# Patient Record
Sex: FEMALE | Employment: FULL TIME | URBAN - METROPOLITAN AREA
[De-identification: names, ages, dates, MRNs, and addresses within clinical notes are randomized per-mention and may not be internally consistent; named-entity substitution may affect disease eponyms.]

---

## 2024-01-10 ENCOUNTER — APPOINTMENT (OUTPATIENT)
Dept: OBSTETRICS AND GYNECOLOGY | Facility: HOSPITAL | Age: 29
End: 2024-01-10
Payer: MEDICAID

## 2024-02-01 ENCOUNTER — APPOINTMENT (OUTPATIENT)
Dept: OBSTETRICS AND GYNECOLOGY | Facility: CLINIC | Age: 29
End: 2024-02-01
Payer: MEDICAID

## 2024-02-05 ENCOUNTER — OFFICE VISIT (OUTPATIENT)
Dept: OBSTETRICS AND GYNECOLOGY | Facility: CLINIC | Age: 29
End: 2024-02-05
Payer: MEDICAID

## 2024-02-05 VITALS — DIASTOLIC BLOOD PRESSURE: 83 MMHG | SYSTOLIC BLOOD PRESSURE: 123 MMHG | WEIGHT: 179 LBS

## 2024-02-05 DIAGNOSIS — O36.80X0 PREGNANCY, LOCATION UNKNOWN (HHS-HCC): Primary | ICD-10-CM

## 2024-02-05 DIAGNOSIS — O20.9 VAGINAL BLEEDING IN PREGNANCY, FIRST TRIMESTER (HHS-HCC): ICD-10-CM

## 2024-02-05 LAB
POC APPEARANCE, URINE: CLEAR
POC BILIRUBIN, URINE: NEGATIVE
POC BLOOD, URINE: ABNORMAL
POC COLOR, URINE: ABNORMAL
POC GLUCOSE, URINE: NEGATIVE MG/DL
POC KETONES, URINE: NEGATIVE MG/DL
POC LEUKOCYTES, URINE: ABNORMAL
POC NITRITE,URINE: NEGATIVE
POC PH, URINE: 8.5 PH
POC PROTEIN, URINE: NEGATIVE MG/DL
POC SPECIFIC GRAVITY, URINE: <=1.005

## 2024-02-05 PROCEDURE — 99204 OFFICE O/P NEW MOD 45 MIN: CPT | Performed by: ADVANCED PRACTICE MIDWIFE

## 2024-02-05 PROCEDURE — 1036F TOBACCO NON-USER: CPT | Performed by: ADVANCED PRACTICE MIDWIFE

## 2024-02-05 PROCEDURE — 81003 URINALYSIS AUTO W/O SCOPE: CPT | Performed by: ADVANCED PRACTICE MIDWIFE

## 2024-02-05 ASSESSMENT — ENCOUNTER SYMPTOMS
CONSTITUTIONAL NEGATIVE: 1
FREQUENCY: 1
RESPIRATORY NEGATIVE: 1
EYES NEGATIVE: 1
ENDOCRINE NEGATIVE: 1

## 2024-02-05 NOTE — PATIENT INSTRUCTIONS
TAKING GOOD CARE OF YOURSELF WHILE YOU ARE PREGNANT    What Should I Eat?  You do not have to eat a lot more food during pregnancy. But it is important to eat the right food--the most  healthy food for you and your baby. Every day, make sure you have:  6 to 8 large glasses of water.  6 to 9 servings of whole grain foods like bread or pasta. By reading the label, you will know that you are getting ‘‘whole’’ grain and not just brown-colored bread or pasta (1 slice of bread or a half cup of cooked pasta is a serving).  3 to 4 servings of fruit. Fresh, raw fruit is best (1 small apple or a half cup of chopped fruit is a serving).  4 to 5 servings of vegetables (1 medium carrot or a half cup of chopped vegetables is a serving).  2 to 3 servings of lean meat, fish, eggs, or nuts. (A piece ofmeat the size of a pack of playing cards is 1 serving.)  1 serving of vitamin C-rich food, like oranges, sweet peppers, or tomatoes (one half cup is a serving).  2 to 3 servings of iron-rich foods, like black-eyed peas, sweet potatoes, greens, dried fruit, or meat.  1 serving of a food rich in folic acid, like dark green, leafy vegetables (one half cup is a serving).    Are Some Foods Dangerous?  Most women can eat any food they want while they are pregnant. But there are some foods that can be  dangerous to the health of your baby.    Fish -- Fish is good food. And it is an important food for growing a smart baby. But some fish have lots of dangerous chemicals. To avoid these chemicals:  Do not eat swordfish, shark, carlos mackerel, or tilefish.  Eat salmon no more than 1 time per week.  Eat only ‘‘light’’ tuna. Do not eat albacore tuna.    Milk and cheese -- Dairy products are an important source of calcium, and calcium helps build strong bones and teeth. But some dairy products carry dangerous germs. To keep yourself and your baby safe, eat and drink only dairy products--such as milk, yogurt, and cheese--that are  pasteurized.    Prepared foods -- Any food that is spoiled or not cooked well can make you sick.  Do not eat any meat or fish that has not been cooked all the way through.  Do not eat any cooked food that has not been kept hot or chilled.  Wash knives, cutting boards, and your hands between handling raw meat and any other food--like fruits and vegetables--that you plan to eat raw.  Wash all fruits and vegetables with 1 tablespoon of vinegar in a pan of water to kill germs before you eat them.    Alcohol -- We know that alcohol is dangerous for your baby if you drink a lot during your pregnancy. It is safest to avoid all alcohol.    Coffee -- The most recent studies say that 2 cups of caffeinated drink each day is safe during pregnancy. This means 2 small cups of coffee or tea or 1 can of caffeinated soda.    Weight Gain  On average, the total amount of weight gain during pregnancy will fall in the following ranges:    Weight Type Average Pounds   Normal weight (BMI 18.5 - 24.9) 25 - 35 pounds   Underweight (BMI less than 18.5) 28 - 40 pounds   Overweight (BMI 25 - 29.9) 15 - 25 pounds   Obese (BMI greater or equal to 30) 11 - 20 pounds       Do I Need to Take Vitamins?  Even if your diet is good, a daily multivitamin is a good idea. All prenatal vitamins are pretty much the same,  so buy the cheapest kind. If you find that your vitamins upset your stomach, take a children’s chewable or gummy  vitamin. Be sure you get at least 400 micrograms of folic acid every day in the vitamin you chose. The number  of micrograms of folic acid is on the label of the bottle.    Is Exercise Important?  Yes! You are getting ready for an athletic event: labor! Daily exercise will help you stay fit, control your  weight, and be prepared for labor. Every day, try to get at least 30 minutes of moderate exercise like walking  or swimming. Do deep squats several times a day. This exercise will help control low back pain and help  prepare  your pelvis for delivery.    Are Some Exercises Dangerous?  You can continue to do pretty much any exercise you have been doing. It is important to avoid any danger of  blows to your stomach. You should avoid scuba diving and contact sports.    What if I Get Sick--Can I Take Medicine?  It is important to limit the medicines you take as much as possible. It is safe to take acetaminophen  (Tylenol). Avoid NSAIDs like ibuprofen (Motrin) and naproxen (Aleve).    Head cold -- Drink lots of fluids, gargle with warm salt water, take warm baths or showers, take Tylenol for headache and sore throat, suck on throat lozenges    Headaches -- Drink at least 8 big glasses of water every day, eat something healthy every 2 to 3 hours during the day, and take Tylenol    Constipation -- Drink lots of water, eat lots of fruits and vegetables, including dried fruits like prunes, and use a fiber supplement like Metamucil    Are There Danger Signs That I Need to Watch Out For?  Call your health care provider if:  You start to bleed like a period  You are leaking fluid  Your baby is not moving (after 24 weeks into your pregnancy)  You are having very bad headaches or your vision is blurry or you see ‘‘spots’’  You are having very bad pain  You are feeling very frightened or sad  You are very worried about something    Our contact information is below in case you or your family need to call:  Your health care provider’s name: MARE Quintana  Your health care provider’s phone number: [phone]

## 2024-02-05 NOTE — PROGRESS NOTES
Subjective   Patient ID: Aileen Rogel is a 28 y.o. female who presents for ER Follow-up (Bleeding in pregnancy 14 weeks).    Is approx 14w4d by LMP did have an early US on 12/4 with an approx GA of 5w1d with no FHR noted and no fetal pole seen. Her hcg in ED on was 53k +. No follow up us was done in ED. Her spotting has since resolved. Denies pain, fever, chills. Endorses urinary frequncy. UA was normal in ED. She is rh+.  ER Follow-up  This is a new problem. The current episode started 1 to 4 weeks ago. The problem occurs intermittently. The problem has been gradually improving. Associated symptoms include urinary symptoms. Nothing aggravates the symptoms. She has tried nothing for the symptoms.       Review of Systems   Constitutional: Negative.    HENT: Negative.     Eyes: Negative.    Respiratory: Negative.     Endocrine: Negative.    Genitourinary:  Positive for frequency and vaginal bleeding.        Vaginal bleeding has improved to minimal spotting   All other systems reviewed and are negative.      Objective   Physical Exam  Vitals reviewed.   Constitutional:       Appearance: Normal appearance.   HENT:      Head: Normocephalic.   Cardiovascular:      Rate and Rhythm: Normal rate.      Pulses: Normal pulses.   Pulmonary:      Effort: Pulmonary effort is normal.   Abdominal:      Palpations: Abdomen is soft.      Comments: Unable to auscultate FHT with doppler in office today. Dating scan ordered. Deferred NOB visit until after US.    Musculoskeletal:         General: Normal range of motion.      Cervical back: Normal range of motion.   Skin:     General: Skin is warm.   Psychiatric:         Mood and Affect: Mood normal.         Assessment/Plan   Diagnoses and all orders for this visit:  Pregnancy, location unknown  Vaginal bleeding in pregnancy, first trimester  -     POCT UA Automated manually resulted  -     Urine Culture; Future  -     Prenatal Screening Panel; Future  -     US PELVIS OB  TRANSABDOMINAL W TRANSVAGINAL UP TO 1ST TRIMESTER; Future  -     US MAC OB imaging order; Future         Urine culture ordered  Neg GC/CT on 1/24  Dating US ordered  Prenatal panel ordered  First trimester education placed in AVS    RTO after US for NOB PRN      Zabrina Licona, CORRIE-KUMAR 02/05/24 12:11 PM

## 2024-02-06 ENCOUNTER — HOSPITAL ENCOUNTER (OUTPATIENT)
Dept: RADIOLOGY | Facility: HOSPITAL | Age: 29
Discharge: HOME | End: 2024-02-06
Payer: MEDICAID

## 2024-02-06 DIAGNOSIS — O20.9 VAGINAL BLEEDING IN PREGNANCY, FIRST TRIMESTER (HHS-HCC): ICD-10-CM

## 2024-02-06 PROCEDURE — 76820 UMBILICAL ARTERY ECHO: CPT

## 2024-02-06 PROCEDURE — 76801 OB US < 14 WKS SINGLE FETUS: CPT

## 2024-02-07 NOTE — PROGRESS NOTES
"[facility] Fellow Note: Fetal Care Clinic Consult  Initial Visit  2024    Ms. Aileen Rogel is a 28 y.o.  at 14w3d by LMP?*** who presents for fetal care clinic  She was referred by Zabrina Licona CNM.     Prior ultrasounds revealed***  - 24 US: anhydramnios, no renal arteries, no bladder. Placenta <1cm from internal os.   \"I explained to the patient that today's findings could be the consequence of rupture of the membranes as well as renal agenesis. Although, fetal urine contributes to the amniotic fluid formation at a later gestational age, it is not uncommon to see renal agenesis with anhydramnios at early gestational age. I discussed with the patient the risks of anhydramnios that starts at this early gestational age with-and-or absence of fetal kidneys. Different scenarios were presented to Ms Rogel. She understands the poor prognosis mostly due to pulmonary hypoplasia, umbilical cord compression, and  asphyxia.\"   She was offered genetic counseling, expectant management, consultation, termination, amnio-dye if PPROM on diff  Patient accepted [facility] consult, genetic counseling, and discussion of termination    Ultrasound today revealed***    Today patient is *** complaints. She denies vaginal bleeding, abnormal vaginal discharge, pelvic pain and/or cramping, loss of fluid. Reports *** fetal movement.    2 weeks of bleeding***    ROS  See HPI. 14 pt ROS otherwise negative.    No past medical history on file.    No past surgical history on file.    OB History    Para Term  AB Living   1 0           SAB IAB Ectopic Multiple Live Births                  # Outcome Date GA Lbr Bakari/2nd Weight Sex Delivery Anes PTL Lv   1 Current                Social History     Socioeconomic History    Marital status: Unknown     Spouse name: Not on file    Number of children: Not on file    Years of education: Not on file    Highest education level: Not on file   Occupational History    Not on " file   Tobacco Use    Smoking status: Former     Types: Cigarettes    Smokeless tobacco: Never   Substance and Sexual Activity    Alcohol use: Not Currently    Drug use: Never    Sexual activity: Yes     Partners: Male   Other Topics Concern    Not on file   Social History Narrative    Not on file     Social Determinants of Health     Financial Resource Strain: Not on file   Food Insecurity: Not on file   Transportation Needs: Not on file   Physical Activity: Not on file   Stress: Not on file   Social Connections: Not on file   Intimate Partner Violence: Not on file       Family History   Problem Relation Name Age of Onset    Breast cancer Mother  38       Physical Exam  There were no vitals filed for this visit.  General: NAD  CV: RRR  Respiratory: No increased work of breathing noted  Abdomen: ***  Pelvic exam: deferred***    Doppler FHT ***    Assessment and Plan:    No problem-specific Assessment & Plan notes found for this encounter.      #Pregnancy  - Prenatal labs reviewed***  - Dated by LMP***  - Genetics: ***        Thank you for allowing us to participate in the care of your patient. {We anticipate she should be able to continue her care under your supervision. Please do not hesitate to contact us should any concerns arise}{Given her medical complexity we will transfer her care to our service}{We anticipate she should be able to continue her general care under your supervision and we will continue to follow her to manage her ***. Please do not hesitate to contact us with any questions}    Patient seen and discussed with  ***.    Erma Garcia MD  Fellow, Maternal-Fetal Medicine

## 2024-02-08 ENCOUNTER — ANESTHESIA EVENT (OUTPATIENT)
Dept: OBSTETRICS AND GYNECOLOGY | Facility: HOSPITAL | Age: 29
End: 2024-02-08
Payer: MEDICAID

## 2024-02-08 ENCOUNTER — HOSPITAL ENCOUNTER (INPATIENT)
Facility: HOSPITAL | Age: 29
LOS: 1 days | Discharge: HOME | End: 2024-02-09
Attending: STUDENT IN AN ORGANIZED HEALTH CARE EDUCATION/TRAINING PROGRAM | Admitting: OBSTETRICS & GYNECOLOGY
Payer: MEDICAID

## 2024-02-08 ENCOUNTER — ANESTHESIA (OUTPATIENT)
Dept: OBSTETRICS AND GYNECOLOGY | Facility: HOSPITAL | Age: 29
End: 2024-02-08
Payer: MEDICAID

## 2024-02-08 ENCOUNTER — HOSPITAL ENCOUNTER (OUTPATIENT)
Dept: RADIOLOGY | Facility: HOSPITAL | Age: 29
Discharge: HOME | End: 2024-02-08
Payer: MEDICAID

## 2024-02-08 ENCOUNTER — INITIAL PRENATAL (OUTPATIENT)
Dept: MATERNAL FETAL MEDICINE | Facility: HOSPITAL | Age: 29
End: 2024-02-08
Payer: MEDICAID

## 2024-02-08 ENCOUNTER — CLINICAL SUPPORT (OUTPATIENT)
Dept: GENETICS | Facility: HOSPITAL | Age: 29
End: 2024-02-08
Payer: MEDICAID

## 2024-02-08 VITALS — DIASTOLIC BLOOD PRESSURE: 78 MMHG | SYSTOLIC BLOOD PRESSURE: 119 MMHG | WEIGHT: 175 LBS

## 2024-02-08 VITALS
BODY MASS INDEX: 24.5 KG/M2 | DIASTOLIC BLOOD PRESSURE: 78 MMHG | WEIGHT: 175 LBS | HEIGHT: 71 IN | SYSTOLIC BLOOD PRESSURE: 119 MMHG

## 2024-02-08 DIAGNOSIS — Z30.011 ENCOUNTER FOR INITIAL PRESCRIPTION OF CONTRACEPTIVE PILLS: ICD-10-CM

## 2024-02-08 DIAGNOSIS — O03.4 INEVITABLE ABORTION (HHS-HCC): Primary | ICD-10-CM

## 2024-02-08 DIAGNOSIS — O20.9 VAGINAL BLEEDING IN PREGNANCY, FIRST TRIMESTER (HHS-HCC): ICD-10-CM

## 2024-02-08 DIAGNOSIS — O35.9XX0 KNOWN FETAL ANOMALY, ANTEPARTUM, SINGLE OR UNSPECIFIED FETUS (HHS-HCC): ICD-10-CM

## 2024-02-08 LAB
ABO GROUP (TYPE) IN BLOOD: NORMAL
ABO GROUP (TYPE) IN BLOOD: NORMAL
ALBUMIN SERPL BCP-MCNC: 3.8 G/DL (ref 3.4–5)
ALBUMIN SERPL BCP-MCNC: 4.1 G/DL (ref 3.4–5)
ALP SERPL-CCNC: 38 U/L (ref 33–110)
ALP SERPL-CCNC: 44 U/L (ref 33–110)
ALT SERPL W P-5'-P-CCNC: 9 U/L (ref 7–45)
ALT SERPL W P-5'-P-CCNC: 9 U/L (ref 7–45)
ANION GAP SERPL CALC-SCNC: 13 MMOL/L (ref 10–20)
ANION GAP SERPL CALC-SCNC: 14 MMOL/L (ref 10–20)
ANTIBODY SCREEN: NORMAL
APTT PPP: 27 SECONDS (ref 27–38)
APTT PPP: 28 SECONDS (ref 27–38)
AST SERPL W P-5'-P-CCNC: 11 U/L (ref 9–39)
AST SERPL W P-5'-P-CCNC: 13 U/L (ref 9–39)
BASOPHILS # BLD AUTO: 0.02 X10*3/UL (ref 0–0.1)
BASOPHILS NFR BLD AUTO: 0.3 %
BILIRUB SERPL-MCNC: 0.3 MG/DL (ref 0–1.2)
BILIRUB SERPL-MCNC: 0.3 MG/DL (ref 0–1.2)
BUN SERPL-MCNC: 11 MG/DL (ref 6–23)
BUN SERPL-MCNC: 12 MG/DL (ref 6–23)
C TRACH RRNA SPEC QL NAA+PROBE: NEGATIVE
CALCIUM SERPL-MCNC: 10.2 MG/DL (ref 8.6–10.6)
CALCIUM SERPL-MCNC: 9.6 MG/DL (ref 8.6–10.6)
CHLORIDE SERPL-SCNC: 102 MMOL/L (ref 98–107)
CHLORIDE SERPL-SCNC: 104 MMOL/L (ref 98–107)
CO2 SERPL-SCNC: 23 MMOL/L (ref 21–32)
CO2 SERPL-SCNC: 24 MMOL/L (ref 21–32)
CREAT SERPL-MCNC: 0.6 MG/DL (ref 0.5–1.05)
CREAT SERPL-MCNC: 0.71 MG/DL (ref 0.5–1.05)
EGFRCR SERPLBLD CKD-EPI 2021: >90 ML/MIN/1.73M*2
EGFRCR SERPLBLD CKD-EPI 2021: >90 ML/MIN/1.73M*2
EOSINOPHIL # BLD AUTO: 0.04 X10*3/UL (ref 0–0.7)
EOSINOPHIL NFR BLD AUTO: 0.6 %
ERYTHROCYTE [DISTWIDTH] IN BLOOD BY AUTOMATED COUNT: 12.6 % (ref 11.5–14.5)
ERYTHROCYTE [DISTWIDTH] IN BLOOD BY AUTOMATED COUNT: 12.7 % (ref 11.5–14.5)
FIBRINOGEN PPP-MCNC: 449 MG/DL (ref 200–400)
FIBRINOGEN PPP-MCNC: 564 MG/DL (ref 200–400)
GLUCOSE SERPL-MCNC: 74 MG/DL (ref 74–99)
GLUCOSE SERPL-MCNC: 91 MG/DL (ref 74–99)
HCT VFR BLD AUTO: 33.1 % (ref 36–46)
HCT VFR BLD AUTO: 37.8 % (ref 36–46)
HGB BLD-MCNC: 11.1 G/DL (ref 12–16)
HGB BLD-MCNC: 12.8 G/DL (ref 12–16)
IMM GRANULOCYTES # BLD AUTO: 0.04 X10*3/UL (ref 0–0.7)
IMM GRANULOCYTES NFR BLD AUTO: 0.6 % (ref 0–0.9)
INR PPP: 1.1 (ref 0.9–1.1)
INR PPP: 1.1 (ref 0.9–1.1)
LYMPHOCYTES # BLD AUTO: 1.55 X10*3/UL (ref 1.2–4.8)
LYMPHOCYTES NFR BLD AUTO: 21.9 %
MCH RBC QN AUTO: 30.1 PG (ref 26–34)
MCH RBC QN AUTO: 30.3 PG (ref 26–34)
MCHC RBC AUTO-ENTMCNC: 33.5 G/DL (ref 32–36)
MCHC RBC AUTO-ENTMCNC: 33.9 G/DL (ref 32–36)
MCV RBC AUTO: 89 FL (ref 80–100)
MCV RBC AUTO: 90 FL (ref 80–100)
MONOCYTES # BLD AUTO: 0.41 X10*3/UL (ref 0.1–1)
MONOCYTES NFR BLD AUTO: 5.8 %
N GONORRHOEA DNA SPEC QL PROBE+SIG AMP: NEGATIVE
NEUTROPHILS # BLD AUTO: 5.03 X10*3/UL (ref 1.2–7.7)
NEUTROPHILS NFR BLD AUTO: 70.8 %
NRBC BLD-RTO: 0 /100 WBCS (ref 0–0)
NRBC BLD-RTO: 0 /100 WBCS (ref 0–0)
PLATELET # BLD AUTO: 251 X10*3/UL (ref 150–450)
PLATELET # BLD AUTO: 252 X10*3/UL (ref 150–450)
POTASSIUM SERPL-SCNC: 3.8 MMOL/L (ref 3.5–5.3)
POTASSIUM SERPL-SCNC: 4.1 MMOL/L (ref 3.5–5.3)
PROT SERPL-MCNC: 6.7 G/DL (ref 6.4–8.2)
PROT SERPL-MCNC: 8 G/DL (ref 6.4–8.2)
PROTHROMBIN TIME: 12.1 SECONDS (ref 9.8–12.8)
PROTHROMBIN TIME: 12.6 SECONDS (ref 9.8–12.8)
RBC # BLD AUTO: 3.69 X10*6/UL (ref 4–5.2)
RBC # BLD AUTO: 4.23 X10*6/UL (ref 4–5.2)
RH FACTOR (ANTIGEN D): NORMAL
RH FACTOR (ANTIGEN D): NORMAL
SODIUM SERPL-SCNC: 136 MMOL/L (ref 136–145)
SODIUM SERPL-SCNC: 136 MMOL/L (ref 136–145)
WBC # BLD AUTO: 7.1 X10*3/UL (ref 4.4–11.3)
WBC # BLD AUTO: 8.2 X10*3/UL (ref 4.4–11.3)

## 2024-02-08 PROCEDURE — 76817 TRANSVAGINAL US OBSTETRIC: CPT | Performed by: OBSTETRICS & GYNECOLOGY

## 2024-02-08 PROCEDURE — 85025 COMPLETE CBC W/AUTO DIFF WBC: CPT

## 2024-02-08 PROCEDURE — 36415 COLL VENOUS BLD VENIPUNCTURE: CPT

## 2024-02-08 PROCEDURE — 2500000005 HC RX 250 GENERAL PHARMACY W/O HCPCS: Performed by: ANESTHESIOLOGIST ASSISTANT

## 2024-02-08 PROCEDURE — 86920 COMPATIBILITY TEST SPIN: CPT

## 2024-02-08 PROCEDURE — 85610 PROTHROMBIN TIME: CPT

## 2024-02-08 PROCEDURE — 2500000001 HC RX 250 WO HCPCS SELF ADMINISTERED DRUGS (ALT 637 FOR MEDICARE OP)

## 2024-02-08 PROCEDURE — 85384 FIBRINOGEN ACTIVITY: CPT | Performed by: STUDENT IN AN ORGANIZED HEALTH CARE EDUCATION/TRAINING PROGRAM

## 2024-02-08 PROCEDURE — 2500000004 HC RX 250 GENERAL PHARMACY W/ HCPCS (ALT 636 FOR OP/ED): Performed by: STUDENT IN AN ORGANIZED HEALTH CARE EDUCATION/TRAINING PROGRAM

## 2024-02-08 PROCEDURE — 87800 DETECT AGNT MULT DNA DIREC: CPT

## 2024-02-08 PROCEDURE — 85384 FIBRINOGEN ACTIVITY: CPT

## 2024-02-08 PROCEDURE — 3E0K7GC INTRODUCTION OF OTHER THERAPEUTIC SUBSTANCE INTO GENITOURINARY TRACT, VIA NATURAL OR ARTIFICIAL OPENING: ICD-10-PCS | Performed by: OBSTETRICS & GYNECOLOGY

## 2024-02-08 PROCEDURE — A59812 PR SURG RX INCOMPLETE ABORTN: Performed by: ANESTHESIOLOGY

## 2024-02-08 PROCEDURE — 76815 OB US LIMITED FETUS(S): CPT

## 2024-02-08 PROCEDURE — 80053 COMPREHEN METABOLIC PANEL: CPT

## 2024-02-08 PROCEDURE — 84075 ASSAY ALKALINE PHOSPHATASE: CPT | Performed by: STUDENT IN AN ORGANIZED HEALTH CARE EDUCATION/TRAINING PROGRAM

## 2024-02-08 PROCEDURE — 85730 THROMBOPLASTIN TIME PARTIAL: CPT | Performed by: STUDENT IN AN ORGANIZED HEALTH CARE EDUCATION/TRAINING PROGRAM

## 2024-02-08 PROCEDURE — 88305 TISSUE EXAM BY PATHOLOGIST: CPT | Performed by: STUDENT IN AN ORGANIZED HEALTH CARE EDUCATION/TRAINING PROGRAM

## 2024-02-08 PROCEDURE — 3700000018 HC OB ANESTHESIA C-SECTION: Performed by: OBSTETRICS & GYNECOLOGY

## 2024-02-08 PROCEDURE — 88305 TISSUE EXAM BY PATHOLOGIST: CPT | Mod: TC,SUR

## 2024-02-08 PROCEDURE — 1100000001 HC PRIVATE ROOM DAILY

## 2024-02-08 PROCEDURE — 76815 OB US LIMITED FETUS(S): CPT | Performed by: OBSTETRICS & GYNECOLOGY

## 2024-02-08 PROCEDURE — 2500000004 HC RX 250 GENERAL PHARMACY W/ HCPCS (ALT 636 FOR OP/ED)

## 2024-02-08 PROCEDURE — 85027 COMPLETE CBC AUTOMATED: CPT | Performed by: STUDENT IN AN ORGANIZED HEALTH CARE EDUCATION/TRAINING PROGRAM

## 2024-02-08 PROCEDURE — 86901 BLOOD TYPING SEROLOGIC RH(D): CPT

## 2024-02-08 PROCEDURE — 3700000014 HC AN EPIDURAL BLOCK CHARGE: Performed by: OBSTETRICS & GYNECOLOGY

## 2024-02-08 PROCEDURE — 2500000004 HC RX 250 GENERAL PHARMACY W/ HCPCS (ALT 636 FOR OP/ED): Performed by: ANESTHESIOLOGIST ASSISTANT

## 2024-02-08 PROCEDURE — 0W3R7ZZ CONTROL BLEEDING IN GENITOURINARY TRACT, VIA NATURAL OR ARTIFICIAL OPENING: ICD-10-PCS | Performed by: OBSTETRICS & GYNECOLOGY

## 2024-02-08 PROCEDURE — 59841 INDUCED ABORTION DILAT&EVAC: CPT | Performed by: OBSTETRICS & GYNECOLOGY

## 2024-02-08 PROCEDURE — 10A07ZZ ABORTION OF PRODUCTS OF CONCEPTION, VIA NATURAL OR ARTIFICIAL OPENING: ICD-10-PCS | Performed by: OBSTETRICS & GYNECOLOGY

## 2024-02-08 PROCEDURE — 2500000005 HC RX 250 GENERAL PHARMACY W/O HCPCS: Performed by: STUDENT IN AN ORGANIZED HEALTH CARE EDUCATION/TRAINING PROGRAM

## 2024-02-08 RX ORDER — CARBOPROST TROMETHAMINE 250 UG/ML
250 INJECTION, SOLUTION INTRAMUSCULAR ONCE AS NEEDED
Status: DISCONTINUED | OUTPATIENT
Start: 2024-02-08 | End: 2024-02-08

## 2024-02-08 RX ORDER — MISOPROSTOL 200 UG/1
800 TABLET ORAL ONCE AS NEEDED
Status: COMPLETED | OUTPATIENT
Start: 2024-02-08 | End: 2024-02-08

## 2024-02-08 RX ORDER — NIFEDIPINE 10 MG/1
10 CAPSULE ORAL ONCE AS NEEDED
Status: DISCONTINUED | OUTPATIENT
Start: 2024-02-08 | End: 2024-02-08

## 2024-02-08 RX ORDER — HYDRALAZINE HYDROCHLORIDE 20 MG/ML
5 INJECTION INTRAMUSCULAR; INTRAVENOUS ONCE AS NEEDED
Status: DISCONTINUED | OUTPATIENT
Start: 2024-02-08 | End: 2024-02-08

## 2024-02-08 RX ORDER — DOXYCYCLINE 100 MG/10ML
INJECTION, POWDER, LYOPHILIZED, FOR SOLUTION INTRAVENOUS AS NEEDED
Status: DISCONTINUED | OUTPATIENT
Start: 2024-02-08 | End: 2024-02-08

## 2024-02-08 RX ORDER — FENTANYL CITRATE 50 UG/ML
INJECTION, SOLUTION INTRAMUSCULAR; INTRAVENOUS AS NEEDED
Status: DISCONTINUED | OUTPATIENT
Start: 2024-02-08 | End: 2024-02-08

## 2024-02-08 RX ORDER — OXYTOCIN 10 [USP'U]/ML
10 INJECTION, SOLUTION INTRAMUSCULAR; INTRAVENOUS ONCE AS NEEDED
Status: DISCONTINUED | OUTPATIENT
Start: 2024-02-08 | End: 2024-02-08

## 2024-02-08 RX ORDER — PHENYLEPHRINE HCL IN 0.9% NACL 0.4MG/10ML
SYRINGE (ML) INTRAVENOUS AS NEEDED
Status: DISCONTINUED | OUTPATIENT
Start: 2024-02-08 | End: 2024-02-08

## 2024-02-08 RX ORDER — METOCLOPRAMIDE 10 MG/1
10 TABLET ORAL EVERY 6 HOURS PRN
Status: DISCONTINUED | OUTPATIENT
Start: 2024-02-08 | End: 2024-02-08

## 2024-02-08 RX ORDER — TRANEXAMIC ACID 100 MG/ML
1000 INJECTION, SOLUTION INTRAVENOUS ONCE AS NEEDED
Status: DISCONTINUED | OUTPATIENT
Start: 2024-02-08 | End: 2024-02-09 | Stop reason: HOSPADM

## 2024-02-08 RX ORDER — METHYLERGONOVINE MALEATE 0.2 MG/ML
INJECTION INTRAVENOUS AS NEEDED
Status: DISCONTINUED | OUTPATIENT
Start: 2024-02-08 | End: 2024-02-08

## 2024-02-08 RX ORDER — HYDRALAZINE HYDROCHLORIDE 20 MG/ML
5 INJECTION INTRAMUSCULAR; INTRAVENOUS ONCE AS NEEDED
Status: DISCONTINUED | OUTPATIENT
Start: 2024-02-08 | End: 2024-02-09 | Stop reason: HOSPADM

## 2024-02-08 RX ORDER — MISOPROSTOL 200 UG/1
400 TABLET ORAL ONCE
Status: COMPLETED | OUTPATIENT
Start: 2024-02-08 | End: 2024-02-08

## 2024-02-08 RX ORDER — IBUPROFEN 600 MG/1
600 TABLET ORAL EVERY 6 HOURS SCHEDULED
Status: DISCONTINUED | OUTPATIENT
Start: 2024-02-08 | End: 2024-02-09 | Stop reason: HOSPADM

## 2024-02-08 RX ORDER — LABETALOL HYDROCHLORIDE 5 MG/ML
20 INJECTION, SOLUTION INTRAVENOUS ONCE AS NEEDED
Status: DISCONTINUED | OUTPATIENT
Start: 2024-02-08 | End: 2024-02-09 | Stop reason: HOSPADM

## 2024-02-08 RX ORDER — TRANEXAMIC ACID 100 MG/ML
1000 INJECTION, SOLUTION INTRAVENOUS ONCE AS NEEDED
Status: DISCONTINUED | OUTPATIENT
Start: 2024-02-08 | End: 2024-02-08

## 2024-02-08 RX ORDER — SODIUM CHLORIDE, SODIUM LACTATE, POTASSIUM CHLORIDE, CALCIUM CHLORIDE 600; 310; 30; 20 MG/100ML; MG/100ML; MG/100ML; MG/100ML
125 INJECTION, SOLUTION INTRAVENOUS CONTINUOUS
Status: DISCONTINUED | OUTPATIENT
Start: 2024-02-08 | End: 2024-02-09 | Stop reason: HOSPADM

## 2024-02-08 RX ORDER — ONDANSETRON HYDROCHLORIDE 2 MG/ML
4 INJECTION, SOLUTION INTRAVENOUS EVERY 6 HOURS PRN
Status: DISCONTINUED | OUTPATIENT
Start: 2024-02-08 | End: 2024-02-08

## 2024-02-08 RX ORDER — LIDOCAINE HYDROCHLORIDE 10 MG/ML
30 INJECTION INFILTRATION; PERINEURAL ONCE AS NEEDED
Status: DISCONTINUED | OUTPATIENT
Start: 2024-02-08 | End: 2024-02-09 | Stop reason: HOSPADM

## 2024-02-08 RX ORDER — PROPOFOL 10 MG/ML
INJECTION, EMULSION INTRAVENOUS AS NEEDED
Status: DISCONTINUED | OUTPATIENT
Start: 2024-02-08 | End: 2024-02-08

## 2024-02-08 RX ORDER — OXYTOCIN/0.9 % SODIUM CHLORIDE 30/500 ML
60 PLASTIC BAG, INJECTION (ML) INTRAVENOUS ONCE AS NEEDED
Status: DISCONTINUED | OUTPATIENT
Start: 2024-02-08 | End: 2024-02-09 | Stop reason: HOSPADM

## 2024-02-08 RX ORDER — TERBUTALINE SULFATE 1 MG/ML
0.25 INJECTION SUBCUTANEOUS ONCE AS NEEDED
Status: DISCONTINUED | OUTPATIENT
Start: 2024-02-08 | End: 2024-02-08

## 2024-02-08 RX ORDER — OXYTOCIN 10 [USP'U]/ML
10 INJECTION, SOLUTION INTRAMUSCULAR; INTRAVENOUS ONCE AS NEEDED
Status: DISCONTINUED | OUTPATIENT
Start: 2024-02-08 | End: 2024-02-09 | Stop reason: HOSPADM

## 2024-02-08 RX ORDER — OXYTOCIN/0.9 % SODIUM CHLORIDE 30/500 ML
60 PLASTIC BAG, INJECTION (ML) INTRAVENOUS ONCE AS NEEDED
Status: DISCONTINUED | OUTPATIENT
Start: 2024-02-08 | End: 2024-02-08

## 2024-02-08 RX ORDER — METOCLOPRAMIDE HYDROCHLORIDE 5 MG/ML
10 INJECTION INTRAMUSCULAR; INTRAVENOUS EVERY 6 HOURS PRN
Status: DISCONTINUED | OUTPATIENT
Start: 2024-02-08 | End: 2024-02-09 | Stop reason: HOSPADM

## 2024-02-08 RX ORDER — METOCLOPRAMIDE HYDROCHLORIDE 5 MG/ML
10 INJECTION INTRAMUSCULAR; INTRAVENOUS EVERY 6 HOURS PRN
Status: DISCONTINUED | OUTPATIENT
Start: 2024-02-08 | End: 2024-02-08

## 2024-02-08 RX ORDER — MISOPROSTOL 200 UG/1
800 TABLET ORAL ONCE AS NEEDED
Status: DISCONTINUED | OUTPATIENT
Start: 2024-02-08 | End: 2024-02-08

## 2024-02-08 RX ORDER — HYDROMORPHONE HYDROCHLORIDE 1 MG/ML
INJECTION, SOLUTION INTRAMUSCULAR; INTRAVENOUS; SUBCUTANEOUS AS NEEDED
Status: DISCONTINUED | OUTPATIENT
Start: 2024-02-08 | End: 2024-02-08

## 2024-02-08 RX ORDER — TERBUTALINE SULFATE 1 MG/ML
0.25 INJECTION SUBCUTANEOUS ONCE AS NEEDED
Status: DISCONTINUED | OUTPATIENT
Start: 2024-02-08 | End: 2024-02-09 | Stop reason: HOSPADM

## 2024-02-08 RX ORDER — NIFEDIPINE 10 MG/1
10 CAPSULE ORAL ONCE AS NEEDED
Status: DISCONTINUED | OUTPATIENT
Start: 2024-02-08 | End: 2024-02-09 | Stop reason: HOSPADM

## 2024-02-08 RX ORDER — ACETAMINOPHEN 325 MG/1
975 TABLET ORAL EVERY 6 HOURS
Status: DISCONTINUED | OUTPATIENT
Start: 2024-02-08 | End: 2024-02-09 | Stop reason: HOSPADM

## 2024-02-08 RX ORDER — LIDOCAINE HYDROCHLORIDE 10 MG/ML
30 INJECTION INFILTRATION; PERINEURAL ONCE AS NEEDED
Status: DISCONTINUED | OUTPATIENT
Start: 2024-02-08 | End: 2024-02-08

## 2024-02-08 RX ORDER — TRANEXAMIC ACID 100 MG/ML
INJECTION, SOLUTION INTRAVENOUS AS NEEDED
Status: DISCONTINUED | OUTPATIENT
Start: 2024-02-08 | End: 2024-02-08

## 2024-02-08 RX ORDER — LOPERAMIDE HYDROCHLORIDE 2 MG/1
4 CAPSULE ORAL EVERY 2 HOUR PRN
Status: DISCONTINUED | OUTPATIENT
Start: 2024-02-08 | End: 2024-02-08

## 2024-02-08 RX ORDER — METOCLOPRAMIDE 10 MG/1
10 TABLET ORAL EVERY 6 HOURS PRN
Status: DISCONTINUED | OUTPATIENT
Start: 2024-02-08 | End: 2024-02-09 | Stop reason: HOSPADM

## 2024-02-08 RX ORDER — LABETALOL HYDROCHLORIDE 5 MG/ML
20 INJECTION, SOLUTION INTRAVENOUS ONCE AS NEEDED
Status: DISCONTINUED | OUTPATIENT
Start: 2024-02-08 | End: 2024-02-08

## 2024-02-08 RX ORDER — ONDANSETRON 4 MG/1
4 TABLET, FILM COATED ORAL EVERY 6 HOURS PRN
Status: DISCONTINUED | OUTPATIENT
Start: 2024-02-08 | End: 2024-02-08

## 2024-02-08 RX ORDER — LOPERAMIDE HYDROCHLORIDE 2 MG/1
4 CAPSULE ORAL EVERY 2 HOUR PRN
Status: DISCONTINUED | OUTPATIENT
Start: 2024-02-08 | End: 2024-02-09 | Stop reason: HOSPADM

## 2024-02-08 RX ORDER — SODIUM CHLORIDE, SODIUM LACTATE, POTASSIUM CHLORIDE, CALCIUM CHLORIDE 600; 310; 30; 20 MG/100ML; MG/100ML; MG/100ML; MG/100ML
INJECTION, SOLUTION INTRAVENOUS CONTINUOUS PRN
Status: DISCONTINUED | OUTPATIENT
Start: 2024-02-08 | End: 2024-02-08

## 2024-02-08 RX ORDER — MIDAZOLAM HYDROCHLORIDE 1 MG/ML
INJECTION, SOLUTION INTRAMUSCULAR; INTRAVENOUS AS NEEDED
Status: DISCONTINUED | OUTPATIENT
Start: 2024-02-08 | End: 2024-02-08

## 2024-02-08 RX ORDER — METHYLERGONOVINE MALEATE 0.2 MG/ML
0.2 INJECTION INTRAVENOUS ONCE AS NEEDED
Status: DISCONTINUED | OUTPATIENT
Start: 2024-02-08 | End: 2024-02-09 | Stop reason: HOSPADM

## 2024-02-08 RX ORDER — CARBOPROST TROMETHAMINE 250 UG/ML
250 INJECTION, SOLUTION INTRAMUSCULAR ONCE AS NEEDED
Status: DISCONTINUED | OUTPATIENT
Start: 2024-02-08 | End: 2024-02-09 | Stop reason: HOSPADM

## 2024-02-08 RX ORDER — METHYLERGONOVINE MALEATE 0.2 MG/ML
0.2 INJECTION INTRAVENOUS ONCE AS NEEDED
Status: DISCONTINUED | OUTPATIENT
Start: 2024-02-08 | End: 2024-02-08

## 2024-02-08 RX ORDER — KETOROLAC TROMETHAMINE 30 MG/ML
INJECTION, SOLUTION INTRAMUSCULAR; INTRAVENOUS AS NEEDED
Status: DISCONTINUED | OUTPATIENT
Start: 2024-02-08 | End: 2024-02-08

## 2024-02-08 RX ORDER — ONDANSETRON 4 MG/1
4 TABLET, FILM COATED ORAL EVERY 6 HOURS PRN
Status: DISCONTINUED | OUTPATIENT
Start: 2024-02-08 | End: 2024-02-09 | Stop reason: HOSPADM

## 2024-02-08 RX ORDER — LIDOCAINE HYDROCHLORIDE 20 MG/ML
INJECTION, SOLUTION INFILTRATION; PERINEURAL AS NEEDED
Status: DISCONTINUED | OUTPATIENT
Start: 2024-02-08 | End: 2024-02-08

## 2024-02-08 RX ORDER — ONDANSETRON HYDROCHLORIDE 2 MG/ML
4 INJECTION, SOLUTION INTRAVENOUS EVERY 6 HOURS PRN
Status: DISCONTINUED | OUTPATIENT
Start: 2024-02-08 | End: 2024-02-09 | Stop reason: HOSPADM

## 2024-02-08 RX ADMIN — PROPOFOL 50 MG: 10 INJECTION, EMULSION INTRAVENOUS at 19:15

## 2024-02-08 RX ADMIN — GENTAMICIN SULFATE 400 MG: 40 INJECTION, SOLUTION INTRAMUSCULAR; INTRAVENOUS at 20:16

## 2024-02-08 RX ADMIN — SODIUM CHLORIDE, POTASSIUM CHLORIDE, SODIUM LACTATE AND CALCIUM CHLORIDE: 600; 310; 30; 20 INJECTION, SOLUTION INTRAVENOUS at 18:10

## 2024-02-08 RX ADMIN — METHYLERGONOVINE MALEATE 0.2 MG: 0.2 INJECTION, SOLUTION INTRAMUSCULAR; INTRAVENOUS at 19:04

## 2024-02-08 RX ADMIN — AMPICILLIN SODIUM 2 G: 2 INJECTION, POWDER, FOR SOLUTION INTRAVENOUS at 21:58

## 2024-02-08 RX ADMIN — PROPOFOL 50 MG: 10 INJECTION, EMULSION INTRAVENOUS at 19:19

## 2024-02-08 RX ADMIN — SODIUM CHLORIDE, POTASSIUM CHLORIDE, SODIUM LACTATE AND CALCIUM CHLORIDE 125 ML/HR: 600; 310; 30; 20 INJECTION, SOLUTION INTRAVENOUS at 20:15

## 2024-02-08 RX ADMIN — Medication 40 MCG: at 18:59

## 2024-02-08 RX ADMIN — PROPOFOL 20 MG: 10 INJECTION, EMULSION INTRAVENOUS at 19:08

## 2024-02-08 RX ADMIN — PROPOFOL 20 MG: 10 INJECTION, EMULSION INTRAVENOUS at 19:25

## 2024-02-08 RX ADMIN — LOPERAMIDE HYDROCHLORIDE 4 MG: 2 CAPSULE ORAL at 21:04

## 2024-02-08 RX ADMIN — FENTANYL CITRATE 100 MCG: 50 INJECTION, SOLUTION INTRAMUSCULAR; INTRAVENOUS at 18:23

## 2024-02-08 RX ADMIN — MISOPROSTOL 400 MCG: 200 TABLET ORAL at 16:51

## 2024-02-08 RX ADMIN — MISOPROSTOL 800 MCG: 200 TABLET ORAL at 19:03

## 2024-02-08 RX ADMIN — SODIUM CHLORIDE, POTASSIUM CHLORIDE, SODIUM LACTATE AND CALCIUM CHLORIDE 125 ML/HR: 600; 310; 30; 20 INJECTION, SOLUTION INTRAVENOUS at 16:57

## 2024-02-08 RX ADMIN — KETOROLAC TROMETHAMINE 30 MG: 30 INJECTION, SOLUTION INTRAMUSCULAR at 19:13

## 2024-02-08 RX ADMIN — HYDROMORPHONE HYDROCHLORIDE 0.25 MG: 1 INJECTION, SOLUTION INTRAMUSCULAR; INTRAVENOUS; SUBCUTANEOUS at 19:27

## 2024-02-08 RX ADMIN — HYDROMORPHONE HYDROCHLORIDE 0.25 MG: 1 INJECTION, SOLUTION INTRAMUSCULAR; INTRAVENOUS; SUBCUTANEOUS at 19:20

## 2024-02-08 RX ADMIN — HYDROMORPHONE HYDROCHLORIDE 0.25 MG: 1 INJECTION, SOLUTION INTRAMUSCULAR; INTRAVENOUS; SUBCUTANEOUS at 19:46

## 2024-02-08 RX ADMIN — DOXYCYCLINE 200 MG: 100 INJECTION, POWDER, LYOPHILIZED, FOR SOLUTION INTRAVENOUS at 18:25

## 2024-02-08 RX ADMIN — ONDANSETRON 4 MG: 2 INJECTION INTRAMUSCULAR; INTRAVENOUS at 19:13

## 2024-02-08 RX ADMIN — PROPOFOL 200 MG: 10 INJECTION, EMULSION INTRAVENOUS at 18:23

## 2024-02-08 RX ADMIN — PROPOFOL 20 MG: 10 INJECTION, EMULSION INTRAVENOUS at 19:23

## 2024-02-08 RX ADMIN — MIDAZOLAM 2 MG: 1 INJECTION INTRAMUSCULAR; INTRAVENOUS at 18:23

## 2024-02-08 RX ADMIN — ACETAMINOPHEN 975 MG: 325 TABLET ORAL at 21:04

## 2024-02-08 RX ADMIN — LIDOCAINE HYDROCHLORIDE 100 MG: 20 INJECTION, SOLUTION INFILTRATION; PERINEURAL at 18:23

## 2024-02-08 RX ADMIN — PROPOFOL 40 MG: 10 INJECTION, EMULSION INTRAVENOUS at 19:10

## 2024-02-08 RX ADMIN — TRANEXAMIC ACID 1000 MG: 100 INJECTION, SOLUTION INTRAVENOUS at 19:05

## 2024-02-08 SDOH — SOCIAL STABILITY: SOCIAL INSECURITY: HAVE YOU HAD THOUGHTS OF HARMING ANYONE ELSE?: NO

## 2024-02-08 SDOH — SOCIAL STABILITY: SOCIAL INSECURITY: VERBAL ABUSE: DENIES

## 2024-02-08 SDOH — HEALTH STABILITY: MENTAL HEALTH: NON-SPECIFIC ACTIVE SUICIDAL THOUGHTS (PAST 1 MONTH): NO

## 2024-02-08 SDOH — SOCIAL STABILITY: SOCIAL INSECURITY: ABUSE SCREEN: ADULT

## 2024-02-08 SDOH — ECONOMIC STABILITY: HOUSING INSECURITY: DO YOU FEEL UNSAFE GOING BACK TO THE PLACE WHERE YOU ARE LIVING?: NO

## 2024-02-08 SDOH — SOCIAL STABILITY: SOCIAL INSECURITY: ARE YOU OR HAVE YOU BEEN THREATENED OR ABUSED PHYSICALLY, EMOTIONALLY, OR SEXUALLY BY ANYONE?: NO

## 2024-02-08 SDOH — SOCIAL STABILITY: SOCIAL INSECURITY: PHYSICAL ABUSE: DENIES

## 2024-02-08 SDOH — SOCIAL STABILITY: SOCIAL INSECURITY: ARE THERE ANY APPARENT SIGNS OF INJURIES/BEHAVIORS THAT COULD BE RELATED TO ABUSE/NEGLECT?: NO

## 2024-02-08 SDOH — SOCIAL STABILITY: SOCIAL INSECURITY: HAS ANYONE EVER THREATENED TO HURT YOUR FAMILY OR YOUR PETS?: NO

## 2024-02-08 SDOH — HEALTH STABILITY: MENTAL HEALTH: WISH TO BE DEAD (PAST 1 MONTH): NO

## 2024-02-08 SDOH — HEALTH STABILITY: MENTAL HEALTH: SUICIDAL BEHAVIOR (LIFETIME): NO

## 2024-02-08 SDOH — HEALTH STABILITY: MENTAL HEALTH: WERE YOU ABLE TO COMPLETE ALL THE BEHAVIORAL HEALTH SCREENINGS?: YES

## 2024-02-08 SDOH — HEALTH STABILITY: MENTAL HEALTH: HAVE YOU USED ANY SUBSTANCES (CANABIS, COCAINE, HEROIN, HALLUCINOGENS, INHALANTS, ETC.) IN THE PAST 12 MONTHS?: NO

## 2024-02-08 SDOH — SOCIAL STABILITY: SOCIAL INSECURITY: DOES ANYONE TRY TO KEEP YOU FROM HAVING/CONTACTING OTHER FRIENDS OR DOING THINGS OUTSIDE YOUR HOME?: NO

## 2024-02-08 SDOH — SOCIAL STABILITY: SOCIAL INSECURITY: DO YOU FEEL ANYONE HAS EXPLOITED OR TAKEN ADVANTAGE OF YOU FINANCIALLY OR OF YOUR PERSONAL PROPERTY?: NO

## 2024-02-08 SDOH — HEALTH STABILITY: MENTAL HEALTH: HAVE YOU USED ANY PRESCRIPTION DRUGS OTHER THAN PRESCRIBED IN THE PAST 12 MONTHS?: NO

## 2024-02-08 ASSESSMENT — PAIN SCALES - GENERAL
PAINLEVEL_OUTOF10: 0 - NO PAIN
PAIN_LEVEL: 3
PAINLEVEL_OUTOF10: 8
PAINLEVEL_OUTOF10: 0 - NO PAIN
PAINLEVEL_OUTOF10: 5 - MODERATE PAIN
PAINLEVEL_OUTOF10: 8
PAINLEVEL_OUTOF10: 8
PAINLEVEL_OUTOF10: 0 - NO PAIN
PAINLEVEL_OUTOF10: 5 - MODERATE PAIN
PAINLEVEL_OUTOF10: 5 - MODERATE PAIN
PAINLEVEL: 0-NO PAIN

## 2024-02-08 ASSESSMENT — PAIN DESCRIPTION - DESCRIPTORS
DESCRIPTORS: CRAMPING

## 2024-02-08 ASSESSMENT — PATIENT HEALTH QUESTIONNAIRE - PHQ9
1. LITTLE INTEREST OR PLEASURE IN DOING THINGS: NOT AT ALL
SUM OF ALL RESPONSES TO PHQ9 QUESTIONS 1 & 2: 0
SUM OF ALL RESPONSES TO PHQ9 QUESTIONS 1 & 2: 0
2. FEELING DOWN, DEPRESSED OR HOPELESS: NOT AT ALL
1. LITTLE INTEREST OR PLEASURE IN DOING THINGS: NOT AT ALL
2. FEELING DOWN, DEPRESSED OR HOPELESS: NOT AT ALL

## 2024-02-08 ASSESSMENT — LIFESTYLE VARIABLES
HOW MANY STANDARD DRINKS CONTAINING ALCOHOL DO YOU HAVE ON A TYPICAL DAY: PATIENT DOES NOT DRINK
SKIP TO QUESTIONS 9-10: 1
AUDIT-C TOTAL SCORE: 0
HOW OFTEN DO YOU HAVE 6 OR MORE DRINKS ON ONE OCCASION: NEVER
HOW OFTEN DO YOU HAVE 6 OR MORE DRINKS ON ONE OCCASION: NEVER
AUDIT-C TOTAL SCORE: 0
HOW MANY STANDARD DRINKS CONTAINING ALCOHOL DO YOU HAVE ON A TYPICAL DAY: PATIENT DOES NOT DRINK
HOW OFTEN DO YOU HAVE A DRINK CONTAINING ALCOHOL: NEVER
AUDIT-C TOTAL SCORE: 0
AUDIT-C TOTAL SCORE: 0
HOW OFTEN DO YOU HAVE A DRINK CONTAINING ALCOHOL: NEVER
SKIP TO QUESTIONS 9-10: 1

## 2024-02-08 NOTE — ANESTHESIA PROCEDURE NOTES
Airway  Date/Time: 2/8/2024 6:24 PM  Urgency: elective      Staffing  Performed: resident   Authorized by: Rock Lyles MD PhD    Performed by: Los Martinez MD  Patient location during procedure: OR    Indications and Patient Condition  Indications for airway management: anesthesia  Sedation level: deep  Preoxygenated: yes  Patient position: sniffing  Mask difficulty assessment: 0 - not attempted  Planned trial extubation    Final Airway Details  Final airway type: supraglottic airway      Successful airway: Supreme  Size 5     Number of attempts at approach: 1  Ventilation between attempts: none

## 2024-02-08 NOTE — H&P
Obstetrical Admission History and Physical     Aileen Sherry Rogel is a 28 y.o.  presents for suspected PPROM and concern for IAI.     Chief Complaint: No chief complaint on file.    Assessment/Plan    Previable PPROM, chronic abruption, Inevitable   - Patient with leakage of fluid and bleeding for past month as well as anhydramnios on ultrasound consistent with rupture of membranes and chronic abruption  - given fingertip cervical dilation, intermittent cramping, and ongoing bleeding, suspect inevitable   - Patient also with fundal tenderness, likely 2/2 abruption vs IAI. Patient is afebrile, no leukocytosis  - Discussed options for management including expectant, labor indication, or surgical management with D&E. Patient elects to proceed with D&E.   - cbc, coags, fibrinogen wnl. No bleeding on exam at this time, however given concern for evolving IAI and ongoing bleeding for the past month, waive 24hr  consents in the setting of threat to maternal life.   - Patient admitted, consented   - Plan for 400mcg buccal miso prior to procedure  - T&C x1U RBCS  - anesthesia aware  - patient desires hospital dispo, declines genetics.   - Shannan Onur notified, patient declines services.     Patient seen and discussed with Dr. Zheng Howe MD  PGY-2, Obstetrics and Gynecology      Active Problems:  There are no active Hospital Problems.       Subjective / HPI  Patient is a  at 14.3wga by LMP c/w 5wk US who presents for suspected previable pprom and concern for IAI.    Patient reports about 1 month ago she had a large gush of blood after intercourse. She has since had bleeding for the past month off and on like a period with occasional passage of clots. She also reports leaking of fluid since this event for the past month.     Patient was seen for ultrasound in the setting of her vaginal bleeding on . Anhydramnios was noted at that time, no fetal renal arteries or bladder were  visualized, and she was informed of possible rupture of membranes and/or possible renal agenesis and was counseled on option for termination at that time given the detrimental sequelae of anhydramnios.     She then re-presented today  for repeat ultrasound, anhydramnios was again seen. Fetal kidneys and bladder were visualized. Patient was noted to have fundal tednerness on ultrasound examination as well, which raised concern for possible intra-amniotic infection.     Patient reports fever at home last week one time, no fevers since. Denies chills. Denies purulent discharge. She does have intermittent cramping and continues to have vaginal bleeding like a period and leakage of clear fluid.     Pregnancy otherwise notable for:   - former smoker       Obstetrical History   OB History    Para Term  AB Living   1 0           SAB IAB Ectopic Multiple Live Births                  # Outcome Date GA Lbr Bakari/2nd Weight Sex Delivery Anes PTL Lv   1 Current                Past Medical History  No past medical history on file.     Past Surgical History   No past surgical history on file.    Social History  Social History     Tobacco Use    Smoking status: Former     Types: Cigarettes    Smokeless tobacco: Never   Substance Use Topics    Alcohol use: Not Currently     Substance and Sexual Activity   Drug Use Never       Allergies  Patient has no known allergies.     Medications  Medications Prior to Admission   Medication Sig Dispense Refill Last Dose    magnesium 30 mg tablet Take 1 tablet (30 mg) by mouth 2 times a day.       prenatal vit,calc76-iron-folic (Prenatabs Rx) 29 mg iron- 1 mg tablet 1 tablet once daily.          Objective    Last Vitals  Temp Pulse Resp BP MAP O2 Sat   36.5 °C (97.7 °F) 70 16 110/67   97 %     Physical Examination  General: Well appearing, alert  HEENT: normocephalic, EOMI, clear sclera  Cardio: Warm and well perfused  Resp: breathing comfortably on room air  Abd: soft, gravid,  fundus tender to palpation over R lateral aspect  Neuro: grossly intact, no focal deficits  Extremities: full ROM, no calf tenderness  Psych: A&O x3, appropriate mood and affect    SSE: negative for pooling, very scant mount of bloody discharge  SVE: ft/30/high    FHT: appropriate heart tones seen on US today    Lab Review  Lab Results   Component Value Date    ABO AB 02/08/2024    LABRH POS 02/08/2024    ABSCRN NEG 02/08/2024     Lab Results   Component Value Date    WBC 7.1 02/08/2024    HGB 12.8 02/08/2024    HCT 37.8 02/08/2024     02/08/2024     Lab Results   Component Value Date    GLUCOSE 74 02/08/2024     02/08/2024    K 4.1 02/08/2024     02/08/2024    CO2 24 02/08/2024    ANIONGAP 14 02/08/2024    BUN 11 02/08/2024    CREATININE 0.60 02/08/2024    EGFR >90 02/08/2024    CALCIUM 10.2 02/08/2024    ALBUMIN 4.1 02/08/2024    PROT 8.0 02/08/2024    ALKPHOS 44 02/08/2024    ALT 9 02/08/2024    AST 13 02/08/2024    BILITOT 0.3 02/08/2024     Lab Results   Component Value Date    APTT 28 02/08/2024    PROTIME 12.1 02/08/2024    INR 1.1 02/08/2024     Lab Results   Component Value Date    FIBRINOGEN 564 (H) 02/08/2024

## 2024-02-08 NOTE — ANESTHESIA PREPROCEDURE EVALUATION
Patient: Aileen Rogel      Procedure Information       Anesthesia Start Date/Time: 24 1810    Procedure: Dilation and Evacuation    Location: [facility] 03 / [facility] OB    Surgeons: Jerod Calzada MD            Relevant Problems   No relevant active problems       Clinical information reviewed:    Allergies  Meds               NPO Detail:  No data recorded     OB/Gyn Evaluation    Present Pregnancy    Patient is pregnant now.   Obstetric History                Physical Exam    Airway  Mallampati: II  TM distance: >3 FB  Neck ROM: full     Cardiovascular   Rhythm: regular  Rate: normal     Dental    Pulmonary   Breath sounds clear to auscultation     Abdominal   Abdomen: soft             Anesthesia Plan    History of general anesthesia?: no  History of complications of general anesthesia?: no    ASA 2     general     intravenous induction   Postoperative administration of opioids is intended.  Trial extubation is planned.  Anesthetic plan and risks discussed with patient.    Plan discussed with attending and resident.    Attending Anesthesiologist Note  Above information reviewed including relevant HPI, PMHx, PSHx, anesthesia history, labs, and imaging.    Summary (from patient interview and chart review):   Patient presenting with 4 weeks history of persistent vaginal bleeding and watery discharge. On ultrasound 2 days ago anhydramnios was noted, confirmed on ultrasound today. Vital signs are normal. Exam indicates scant bloody discharge and fingertip dilation.  There is right sided fundal tenderness on exam.  Normal WBC and laboratory evaluation. History is consistent for ROM and chronic abruption, inevitable  with concern for evolving intrauterine infection.      Relevant Problems   No relevant active problems        Medication Documentation Review Audit       Reviewed by Alpa Jackson RN (Registered Nurse) on 24 at 1751      Medication Order Taking? Sig Documenting  "Provider Last Dose Status   magnesium 30 mg tablet [id number] No Take 1 tablet (30 mg) by mouth 2 times a day. Historical Provider, MD Unknown Active   prenatal vit,calc76-iron-folic (Prenatabs Rx) 29 mg iron- 1 mg tablet [id number] No 1 tablet once daily. Historical Provider, MD 2/7/2024 Active                    Visit Vitals  /67   Pulse 70   Temp 36.5 °C (97.7 °F) (Temporal)   Resp 16   Ht 1.803 m (5' 11\")   Wt 79.4 kg (175 lb 0.7 oz)   LMP 10/30/2023 (Exact Date)   SpO2 97%   BMI 24.41 kg/m²   OB Status Pregnant   Smoking Status Former   BSA 1.99 m²            2/8/2024     8:41 AM 2/8/2024    10:29 AM 2/8/2024     1:39 PM 2/8/2024     1:40 PM 2/8/2024     2:30 PM 2/8/2024     3:30 PM 2/8/2024     4:51 PM   Vitals   Systolic 119 126 110       Diastolic 78 72 67       Heart Rate  84 76 70      Temp  36.8 °C (98.2 °F) 36.5 °C (97.7 °F)  36.6 °C (97.9 °F) 36.5 °C (97.7 °F) 36.5 °C (97.7 °F)   Resp  16 16       Height (in) 1.803 m (5' 11\")  1.803 m (5' 11\")       Weight (lb) 175  175.05       BMI 24.41 kg/m2  24.41 kg/m2       BSA (m2) 1.99 m2  1.99 m2            Recent Labs:    Chemistry    Lab Results   Component Value Date/Time     02/08/2024 1132    K 4.1 02/08/2024 1132     02/08/2024 1132    CO2 24 02/08/2024 1132    BUN 11 02/08/2024 1132    CREATININE 0.60 02/08/2024 1132    Lab Results   Component Value Date/Time    CALCIUM 10.2 02/08/2024 1132    ALKPHOS 44 02/08/2024 1132    AST 13 02/08/2024 1132    ALT 9 02/08/2024 1132    BILITOT 0.3 02/08/2024 1132          Lab Results   Component Value Date/Time    WBC 7.1 02/08/2024 1132    HGB 12.8 02/08/2024 1132    HCT 37.8 02/08/2024 1132     02/08/2024 1132     Lab Results   Component Value Date/Time    PROTIME 12.1 02/08/2024 1132    INR 1.1 02/08/2024 1132     Anesthesia History: none  Anesthesia Plan: GA/LMA. Std monitors. Pt declines spinal anesthesia    I discussed the anesthesia plan with the patient and/or family and reviewed the " risks, benefits and alternatives. Agree to proceed.  Rock Lyles MD PhD

## 2024-02-09 ENCOUNTER — HOSPITAL ENCOUNTER (INPATIENT)
Facility: HOSPITAL | Age: 29
End: 2024-02-09
Attending: OBSTETRICS & GYNECOLOGY | Admitting: OBSTETRICS & GYNECOLOGY
Payer: COMMERCIAL

## 2024-02-09 VITALS
TEMPERATURE: 97.9 F | BODY MASS INDEX: 24.51 KG/M2 | SYSTOLIC BLOOD PRESSURE: 87 MMHG | HEIGHT: 71 IN | OXYGEN SATURATION: 97 % | RESPIRATION RATE: 16 BRPM | DIASTOLIC BLOOD PRESSURE: 55 MMHG | WEIGHT: 175.04 LBS | HEART RATE: 72 BPM

## 2024-02-09 PROBLEM — O03.4 INEVITABLE ABORTION (HHS-HCC): Status: RESOLVED | Noted: 2024-02-08 | Resolved: 2024-02-09

## 2024-02-09 LAB
ALBUMIN SERPL BCP-MCNC: 3.7 G/DL (ref 3.4–5)
ALP SERPL-CCNC: 37 U/L (ref 33–110)
ALT SERPL W P-5'-P-CCNC: 9 U/L (ref 7–45)
ANION GAP SERPL CALC-SCNC: 15 MMOL/L (ref 10–20)
APTT PPP: 28 SECONDS (ref 27–38)
AST SERPL W P-5'-P-CCNC: 10 U/L (ref 9–39)
BILIRUB SERPL-MCNC: 0.4 MG/DL (ref 0–1.2)
BUN SERPL-MCNC: 15 MG/DL (ref 6–23)
CALCIUM SERPL-MCNC: 9.3 MG/DL (ref 8.6–10.6)
CHLORIDE SERPL-SCNC: 102 MMOL/L (ref 98–107)
CO2 SERPL-SCNC: 22 MMOL/L (ref 21–32)
CREAT SERPL-MCNC: 0.7 MG/DL (ref 0.5–1.05)
EGFRCR SERPLBLD CKD-EPI 2021: >90 ML/MIN/1.73M*2
ERYTHROCYTE [DISTWIDTH] IN BLOOD BY AUTOMATED COUNT: 12.7 % (ref 11.5–14.5)
FIBRINOGEN PPP-MCNC: 431 MG/DL (ref 200–400)
GLUCOSE SERPL-MCNC: 87 MG/DL (ref 74–99)
HCT VFR BLD AUTO: 30.6 % (ref 36–46)
HGB BLD-MCNC: 10.5 G/DL (ref 12–16)
INR PPP: 1.1 (ref 0.9–1.1)
MCH RBC QN AUTO: 30.5 PG (ref 26–34)
MCHC RBC AUTO-ENTMCNC: 34.3 G/DL (ref 32–36)
MCV RBC AUTO: 89 FL (ref 80–100)
NRBC BLD-RTO: 0 /100 WBCS (ref 0–0)
PLATELET # BLD AUTO: 235 X10*3/UL (ref 150–450)
POTASSIUM SERPL-SCNC: 4.4 MMOL/L (ref 3.5–5.3)
PROT SERPL-MCNC: 6.8 G/DL (ref 6.4–8.2)
PROTHROMBIN TIME: 12.3 SECONDS (ref 9.8–12.8)
RBC # BLD AUTO: 3.44 X10*6/UL (ref 4–5.2)
SODIUM SERPL-SCNC: 135 MMOL/L (ref 136–145)
WBC # BLD AUTO: 10 X10*3/UL (ref 4.4–11.3)

## 2024-02-09 PROCEDURE — 51701 INSERT BLADDER CATHETER: CPT

## 2024-02-09 PROCEDURE — 85730 THROMBOPLASTIN TIME PARTIAL: CPT

## 2024-02-09 PROCEDURE — 85027 COMPLETE CBC AUTOMATED: CPT

## 2024-02-09 PROCEDURE — 36415 COLL VENOUS BLD VENIPUNCTURE: CPT

## 2024-02-09 PROCEDURE — 2500000001 HC RX 250 WO HCPCS SELF ADMINISTERED DRUGS (ALT 637 FOR MEDICARE OP): Performed by: STUDENT IN AN ORGANIZED HEALTH CARE EDUCATION/TRAINING PROGRAM

## 2024-02-09 PROCEDURE — 80053 COMPREHEN METABOLIC PANEL: CPT

## 2024-02-09 PROCEDURE — 2500000001 HC RX 250 WO HCPCS SELF ADMINISTERED DRUGS (ALT 637 FOR MEDICARE OP)

## 2024-02-09 PROCEDURE — 99238 HOSP IP/OBS DSCHRG MGMT 30/<: CPT | Performed by: STUDENT IN AN ORGANIZED HEALTH CARE EDUCATION/TRAINING PROGRAM

## 2024-02-09 PROCEDURE — 85384 FIBRINOGEN ACTIVITY: CPT

## 2024-02-09 PROCEDURE — 7100000016 HC LABOR RECOVERY PER HOUR

## 2024-02-09 RX ORDER — ACETAMINOPHEN 325 MG/1
975 TABLET ORAL EVERY 6 HOURS PRN
Qty: 180 TABLET | Refills: 0 | Status: SHIPPED | OUTPATIENT
Start: 2024-02-09

## 2024-02-09 RX ORDER — IBUPROFEN 600 MG/1
600 TABLET ORAL EVERY 6 HOURS PRN
Qty: 180 TABLET | Refills: 0 | Status: SHIPPED | OUTPATIENT
Start: 2024-02-09 | End: 2024-02-09 | Stop reason: SDUPTHER

## 2024-02-09 RX ORDER — IBUPROFEN 600 MG/1
600 TABLET ORAL EVERY 6 HOURS PRN
Qty: 180 TABLET | Refills: 0 | Status: SHIPPED | OUTPATIENT
Start: 2024-02-09 | End: 2024-03-14

## 2024-02-09 RX ORDER — ACETAMINOPHEN 325 MG/1
975 TABLET ORAL EVERY 6 HOURS PRN
Qty: 180 TABLET | Refills: 0 | Status: SHIPPED | OUTPATIENT
Start: 2024-02-09 | End: 2024-02-09 | Stop reason: SDUPTHER

## 2024-02-09 RX ADMIN — ACETAMINOPHEN 975 MG: 325 TABLET ORAL at 08:57

## 2024-02-09 RX ADMIN — IBUPROFEN 600 MG: 600 TABLET, FILM COATED ORAL at 07:29

## 2024-02-09 RX ADMIN — ACETAMINOPHEN 975 MG: 325 TABLET ORAL at 03:21

## 2024-02-09 RX ADMIN — IBUPROFEN 600 MG: 600 TABLET, FILM COATED ORAL at 01:06

## 2024-02-09 ASSESSMENT — PAIN SCALES - GENERAL
PAINLEVEL_OUTOF10: 4
PAINLEVEL_OUTOF10: 6
PAINLEVEL_OUTOF10: 5 - MODERATE PAIN
PAINLEVEL_OUTOF10: 6
PAINLEVEL_OUTOF10: 4
PAINLEVEL_OUTOF10: 7
PAINLEVEL_OUTOF10: 0 - NO PAIN

## 2024-02-09 ASSESSMENT — PAIN DESCRIPTION - DESCRIPTORS
DESCRIPTORS: CRAMPING
DESCRIPTORS: CRAMPING

## 2024-02-09 ASSESSMENT — PAIN DESCRIPTION - LOCATION
LOCATION: BACK
LOCATION: BACK

## 2024-02-09 ASSESSMENT — PAIN - FUNCTIONAL ASSESSMENT: PAIN_FUNCTIONAL_ASSESSMENT: 0-10

## 2024-02-09 NOTE — CARE PLAN
The patient's goals for the shift include to be d/c home    The clinical goals for the shift include scant to no bleeding and meet d/c goals    Over the shift, the patient made progress toward all   Problem:  Loss  Goal: Appropriate  loss/grief for pt/family  Outcome: Met     Problem:  Loss  Goal: Demonstrated coping  Outcome: Met   the following goals. Discharge instructions reviewed with patient and patient has understanding of home going care as well as s/sx to monitor for.

## 2024-02-09 NOTE — DISCHARGE SUMMARY
Discharge Summary    Admission Date: 2024  Discharge Date: 2024    Discharge Diagnosis  Inevitable     Hospital Course  29 yo G1 presented at 14w3d with leakage of fluid and bleeding for past month as well as anhydramnios on ultrasound consistent with rupture of membranes and chronic abruption.  After discussion with patient about risks/benefits/alternatives of management options, she decided on dilation and evacuation. Her hgb started at 12.8 and decreased to 10.5 after intra-op EBL of 1L. See OP report for more details. Her fibrinogen was 564 > 449> 431, and by POD#1 she had scant vaginal bleeding and was meeting postoperative milestones and was stable for discharge home, will plan on [facility] follow up in 2 weeks    Procedures:  Dilation and evacuation   Contraception at discharge: oral contraceptives      Discharge Meds     Your medication list        START taking these medications        Instructions Last Dose Given Next Dose Due   acetaminophen 325 mg tablet  Commonly known as: Tylenol      Take 3 tablets (975 mg) by mouth every 6 hours if needed for mild pain (1 - 3).       drospirenone (contraceptive) 4 mg (28) tablet      Take 1 tablet by mouth once daily.       ibuprofen 600 mg tablet      Take 1 tablet (600 mg) by mouth every 6 hours if needed for mild pain (1 - 3).              CONTINUE taking these medications        Instructions Last Dose Given Next Dose Due   magnesium 30 mg tablet           prenatal vit,calc76-iron-folic 29 mg iron- 1 mg tablet  Commonly known as: Prenatabs Rx                     Where to Get Your Medications        These medications were sent to [facility]/pharmacy #[id number] - [facility] [address] [facility]  [address] [facility] [facility] [address]      Phone: [phone]   drospirenone (contraceptive) 4 mg (28) tablet       [facility]  [address] [facility] [address]      Hours: 8:30 AM to 5 PM Mon-Fri Phone:  [phone]   acetaminophen 325 mg tablet  ibuprofen 600 mg tablet          Complications Requiring Follow-Up  14w inevitable , s/p surgical management     Test Results Pending At Discharge  Pending Labs       Order Current Status    Urine culture Collected (24 1133)    Surgical Pathology Exam - PRODUCTS OF CONCEPTION In process            Outpatient Follow-Up  For follow up with [facility] in 2 weeks       Susannah Lamar MD

## 2024-02-09 NOTE — PROGRESS NOTES
"ANTEPARTUM PROGRESS NOTE   2/9/2024, 8:14 AM     SUBJECTIVE:  No acute events overnight. Patient has no complaints this morning. Pain well controlled, light vaginal bleeding     OBJECTIVE:   BP (!) 87/55 Comment: Frantz RN notified, to bedside  Pulse 72   Temp 36.6 °C (97.9 °F) (Temporal)   Resp 16   Ht 1.803 m (5' 11\")   Wt 79.4 kg (175 lb 0.7 oz)   LMP 10/30/2023 (Exact Date)   SpO2 97%   BMI 24.41 kg/m²    Temp  Min: 36.3 °C (97.3 °F)  Max: 36.8 °C (98.2 °F)  Pulse  Min: 64  Max: 88  BP  Min: 87/55  Max: 126/72    General:  AAOx3, No acute distress  Cardiovascular: Warm and well perfused  Respiratory: Normal respiratory effort   Abdominal:  Soft, fundus firm   Back: No CVA tenderness  Extremities: Warm, well perfused,  edema, no calf tenderness     Labs:   Lab Results   Component Value Date    ABO AB 02/08/2024    LABRH POS 02/08/2024    ABSCRN NEG 02/08/2024     Lab Results   Component Value Date    WBC 10.0 02/09/2024    HGB 10.5 (L) 02/09/2024    HCT 30.6 (L) 02/09/2024     02/09/2024     Lab Results   Component Value Date    GLUCOSE 87 02/09/2024     (L) 02/09/2024    K 4.4 02/09/2024     02/09/2024    CO2 22 02/09/2024    ANIONGAP 15 02/09/2024    BUN 15 02/09/2024    CREATININE 0.70 02/09/2024    EGFR >90 02/09/2024    CALCIUM 9.3 02/09/2024    ALBUMIN 3.7 02/09/2024    PROT 6.8 02/09/2024    ALKPHOS 37 02/09/2024    ALT 9 02/09/2024    AST 10 02/09/2024    BILITOT 0.4 02/09/2024     Lab Results   Component Value Date    APTT 28 02/09/2024    PROTIME 12.3 02/09/2024    INR 1.1 02/09/2024     Lab Results   Component Value Date    FIBRINOGEN 431 (H) 02/09/2024       ASSESSMENT AND PLAN:     28 y.o. G1 now  who is POD#1 from D&E for previable PPROM and inevitable ab at 14w3d      POD#1 D&E for previable PPROM, chronic abruption  - meeting post-op milestones, lochia light, ambulating, voiding, passing flatus   - VSS   - labs stable, Hgb 12.8 > 11.1 > 10.5   Fibrinogen 564 > 449> " 431, coags wnl   - coping well   - patient desires hospital dispo, declines genetics.   - Shannan Onur notified, patient declines services.     Routine:  - BCM: to be discussed     Dispo: inpatient until meeting post op milestones     Pt seen and discussed with [facility] Attending, Dr. Calzada.     Susannah Lamar MD   [facility]  Pager [phone]     Principal Problem:    Inevitable   Active Problems:    Labor and delivery indication for care or intervention

## 2024-02-09 NOTE — SIGNIFICANT EVENT
Kurtz balloon removed    Evaluated patient at 0100 to assess bleeding. Has had minimal bleeding seen on pad since procedure completed, and kurtz tubing partially filled with blood but not actively collecting (stable amount over last 3-4 hours). Pt reports crampy abdominal pain, that is improved but not completely relieved by tylenol. Is about to get first dose of ibuprofen since toradol at end of case.     O)  Visit Vitals  /59   Pulse 71   Temp 36.3 °C (97.3 °F) (Temporal)   Resp 16     Constitutional: No visible distress, alert and cooperative  Respiratory/Thorax: Normal respiratory effort on RA  Cardiovascular: Reg rate  Gastrointestinal: soft, nondistended, nontender  Neurological: A&Ox3  Psychological: Appropriate mood and behavior    30cc saline removed from kurtz balloon at 0100. Left in place until 0130, reassessed and no new bleeding seen in the kurtz tubing or around the catheter. Removed completed at 0130.    Lab Results   Component Value Date    WBC 8.2 02/08/2024    HGB 11.1 (L) 02/08/2024    HCT 33.1 (L) 02/08/2024     02/08/2024     Lab Results   Component Value Date    APTT 27 02/08/2024    PROTIME 12.6 02/08/2024    INR 1.1 02/08/2024     Lab Results   Component Value Date    FIBRINOGEN 449 (H) 02/08/2024       A/P:  - Bleeding minimal since procedure. Kurtz balloon deflated at 0100, removed completely at 0130.   - Labs wnl x1 from immediately postop. Hb preop 12.8 -> EBL 1L -> 11.1 immediately postop.  - Will obtain 2nd set labs at 0230 given high EBL.  - Will administer ibuprofen for pain and cont to assess. Abdominal exam reassuring and vitals stable.    D/w Dr. Aaron Molina MD PGY-2

## 2024-02-09 NOTE — OP NOTE
Date: 2024  [place] [facility] [place] [facility]    Name: Aileen Rogel, : 1995, Age: 28 y.o., MRN: [id number], Sex: female    Diagnosis  Pre-op Diagnosis     * Inevitable  [O03.4] Post-op Diagnosis     * Inevitable  [O03.4]     Procedures  Dilation and Evacuation  23678 - [facility] TX INCOMPLETE  ANY TRIMESTER SURGICAL      Surgeons      * Jerod Calzada - Primary    Resident/Fellow/Other Assistant:  Surgeon(s) and Role:  Lisa Molina MD PGY-2 Resident    Procedure Summary  Anesthesia:  LMA  ASA: II  Anesthesia Staff: Anesthesiologist: Rock Lyles MD PhD  C-AA: VIDHI Lou  Anesthesia Resident: Los Martinez MD  Estimated Blood Loss: 1000 mL  Intra-op Medications:   Administrations occurring from 1830 to 1940 on 24:   Medication Name Total Dose   lactated Ringer's infusion 339.58 mL   miSOPROStoL (Cytotec) tablet 800 mcg 800 mcg              Anesthesia Record               Intraprocedure I/O Totals          Intake    Tranexamic Acid 0.00 mL    The total shown is the total volume documented since Anesthesia Start was filed.    LR infusion 700.00 mL    Total Intake 700 mL       Output    Total Blood Loss - Surgical Delivery (mL) 1000 mL    Total Output 1000 mL       Net    Net Volume -300 mL       Other (could not be determined as input or output)    Surgical Delivery Estimated Blood Loss (mL) 1000          Specimen: Products of conception    Staff:   Circulator: Alpa Jackson RN  Relief Circulator: Palak Vela RN  Relief Scrub: Petra Machado  Scrub Person: Nery Culver         Procedure Details:  The patient was seen in the preoperative area. The site of surgery was properly noted/marked if necessary per policy. Preoperative antibiotics have been ordered. Venous thrombosis prophylaxis are not indicated.    PROCEDURE:    The patient was taken to the operating room and placed in the dorsal supine position on the operating table. General endotracheal  anesthesia was administered uneventfully. She was then placed in dorsal lithotomy position. Exam under anesthesia was performed. Cervix was noted to be 1cm dilated.    The pt was prepped and draped in normal sterile fashion as per protocol for this procedure.  A speculum was placed into the vagina.  The anterior lip of the cervix was grasped with a tenaculum.  The cervix was then dilated with Jerry dilators to 43 Sinhala. Ultrasound and exam were consistent with prior rupture of membranes. Under ultrasound guidance, the Sopher forceps were inserted into the uterus to grasp the umbilical cord. After the cord was severed, but prior to cessation of cardiac activity  brisk bleeding was noted from the cervix and the decision was made to proceed with the evacuation directly due to risk of ongoing hemorrhage. Under ultrasound guidance, the Sopher forceps were used to extract all of the fetal and placental tissue in serial passes.  After the placenta was removed, heavier bleeding was noted from the cervix. A 14 mm curved suction curette was inserted into the uterine cavity and the cavity was evacuated of all remaining blood and products of conception under ultrasound guidance. Initially, brisk bleeding continued. Cytotec, methergine, hemabate, and TXA were administered as continued bleeding occurred. After several passes with the suction curette, the endometrial echo appeared thin consistent with a complete . A bimanual exam was performed as well. The uterine tone was appropriate, but a slow trickle of bleeding continued. Though there was low suspicion for retained products a sharp curette was advanced in to the uterine cavity.  The lower uterine segment was noted to be free of products of conception, however at this time the uterus was sharply anteverted and a sharp curette cold be easily passed; as the endometrial stripe appeared thing and prior suction curettage was not indicated of retained products further  attempts at sharp curettage were deferred as it was felt the risk of perforation was high.   The cervix was examined and no lacerations were seen.  Due to persistent slow bleeding, a kurtz balloon was inserted into the uterus and inflated with 30cc of saline. After placement of the balloon, the bleeding stopped.  No bleeding was noted either around the balloon or to be drained through the catheter. All other instruments were removed from the uterus, cervix, and vagina. The products of conception were evaluated and the presence of the calvarium, thorax/spine, extremities x4, and an appropriate amount of fetal and placental tissue were confirmed. The tissue was collected for pathologic evaluation.    All counts were correct. The specimen was sent to pathology.  Dr. Calzada was present for the entire procedure. The patient was taken from the operating room in stable condition after reversal of anesthesia to a recovery room.  Due to the bleeding encountered during the procedure immediate post-op labs were ordered.      Findings: Normal appearing cervix, thin endometrial stripe at end of case. Products of conception consistent with gestational age.    Complications:   Hemorrhage      Disposition:  L&D room  Condition: stable

## 2024-02-09 NOTE — ANESTHESIA POSTPROCEDURE EVALUATION
Patient: Aileen Rogel    Procedure Summary       Date: 24 Room / Location: [facility] / [facility]    Anesthesia Start:  Anesthesia Stop:     Procedure: Dilation and Evacuation Diagnosis:       Inevitable       (Inevitable  [O03.4])    Surgeons: Jerod Calzada MD Responsible Provider: Rock Lyles MD PhD    Anesthesia Type: general ASA Status: 2            Anesthesia Type: general    Vitals Value Taken Time   BP   127/88 24   Temp 36.3 24   Pulse 80 24   Resp 18 24   SpO2 92 % 24       Anesthesia Post Evaluation    Patient location during evaluation: bedside  Patient participation: complete - patient participated  Level of consciousness: awake  Pain score: 3  Pain management: satisfactory to patient  Airway patency: patent  Cardiovascular status: acceptable  Respiratory status: acceptable  Hydration status: acceptable  Postoperative Nausea and Vomiting: none        No notable events documented.

## 2024-02-09 NOTE — SIGNIFICANT EVENT
Transfer to [facility]    Evaluated patient, blood on pad minimal over last 2 hours. Voiding without difficulty. Crampy abdominal pain is improving with ibuprofen. Denies HA, lightheadedness. Follow up labs with Hb 10.5, fibrinogen and coags wnl. Drop in Hb appropriate given EBL. Stable to transfer to [facility].    D/w Dr. Aaron Molina MD PGY-2

## 2024-02-09 NOTE — DISCHARGE INSTRUCTIONS
At [facility], we understand the emotional impact of the loss of a child, whether through ectopic pregnancy, miscarriage, pregnancy complications, stillbirth or  death. We know this grief can be profound and complicated.     [facility] is here to help. We want to do our best to connect you to the necessary resources that are currently available. We are continually working to identify new resources in the local community and to find ways to create more support for parents and caregivers after the loss of a child. If you feel you need help in finding support, please contact us by phone or email.    For more information, please call [phone] or email [email or web address]

## 2024-02-09 NOTE — CARE PLAN
The patient's goals for the shift include to be discharged home today    The clinical goals for the shift include for patient's bleeding to remain stable so she can go home today    Pt admitted for D&E. QBL 1000 in OR. Pt c/o abdominal cramping 8/10 after returning to her room. Pt received Tylenol and states that she has some relief. Bleeding is stable and uterine cath removed. Pt continue to be stable and transferred to [facility] for observation.

## 2024-02-11 LAB
BLOOD EXPIRATION DATE: NORMAL
DISPENSE STATUS: NORMAL
PRODUCT BLOOD TYPE: 8400
PRODUCT CODE: NORMAL
UNIT ABO: NORMAL
UNIT NUMBER: NORMAL
UNIT RH: NORMAL
UNIT VOLUME: 350
XM INTEP: NORMAL

## 2024-02-12 ENCOUNTER — DOCUMENTATION (OUTPATIENT)
Dept: CASE MANAGEMENT | Facility: HOSPITAL | Age: 29
End: 2024-02-12
Payer: MEDICAID

## 2024-02-12 NOTE — PROGRESS NOTES
Aileen is a 27yo  who presented on 24 w/suspected PPROM and concern for IAI and ultimately underwent a D&E (see 24 H&P or Op Note for more details).  Intern, Javed Cam, called Aileen to introduce self and the [facility] Program.    Aileen's phone went to . Javed left a  with the Program/Shannan Mohr's direct contact information.     PLAN: Will continue to be available for bereavement support and services as needed.      24 at 1:33 PM - Javed Cam    This note has been reviewed and approved by Javed Cam's supervisor:  Shannan Mohr, MSSA, LISW, CGP  Director of Parent Bereavement Programs    Please call [phone] or secure chat Shannan Mohr with any questions or concerns.

## 2024-02-16 LAB
LABORATORY COMMENT REPORT: NORMAL
PATH REPORT.FINAL DX SPEC: NORMAL
PATH REPORT.GROSS SPEC: NORMAL
PATH REPORT.RELEVANT HX SPEC: NORMAL
PATH REPORT.TOTAL CANCER: NORMAL

## 2024-08-27 ENCOUNTER — APPOINTMENT (OUTPATIENT)
Dept: OBSTETRICS AND GYNECOLOGY | Facility: CLINIC | Age: 29
End: 2024-08-27
Payer: COMMERCIAL

## 2024-08-27 VITALS — WEIGHT: 166 LBS | SYSTOLIC BLOOD PRESSURE: 126 MMHG | BODY MASS INDEX: 23.15 KG/M2 | DIASTOLIC BLOOD PRESSURE: 69 MMHG

## 2024-08-27 DIAGNOSIS — Z34.91 PRENATAL CARE IN FIRST TRIMESTER (HHS-HCC): Primary | ICD-10-CM

## 2024-08-27 DIAGNOSIS — Z80.3 FAMILY HISTORY OF BREAST CANCER IN FIRST DEGREE RELATIVE: ICD-10-CM

## 2024-08-27 PROBLEM — Z3A.09 9 WEEKS GESTATION OF PREGNANCY (HHS-HCC): Status: ACTIVE | Noted: 2024-08-27

## 2024-08-27 PROCEDURE — H1000 PRENATAL CARE ATRISK ASSESSM: HCPCS | Performed by: ADVANCED PRACTICE MIDWIFE

## 2024-08-27 PROCEDURE — 87086 URINE CULTURE/COLONY COUNT: CPT

## 2024-08-27 PROCEDURE — 99214 OFFICE O/P EST MOD 30 MIN: CPT | Performed by: ADVANCED PRACTICE MIDWIFE

## 2024-08-27 ASSESSMENT — EDINBURGH POSTNATAL DEPRESSION SCALE (EPDS)
I HAVE FELT SAD OR MISERABLE: NO, NOT AT ALL
I HAVE FELT SCARED OR PANICKY FOR NO GOOD REASON: NO, NOT AT ALL
I HAVE BEEN ANXIOUS OR WORRIED FOR NO GOOD REASON: NO, NOT AT ALL
I HAVE BLAMED MYSELF UNNECESSARILY WHEN THINGS WENT WRONG: NO, NEVER
I HAVE BEEN ABLE TO LAUGH AND SEE THE FUNNY SIDE OF THINGS: AS MUCH AS I ALWAYS COULD
THINGS HAVE BEEN GETTING ON TOP OF ME: NO, I HAVE BEEN COPING AS WELL AS EVER
I HAVE BEEN SO UNHAPPY THAT I HAVE HAD DIFFICULTY SLEEPING: NOT AT ALL
TOTAL SCORE: 0
THE THOUGHT OF HARMING MYSELF HAS OCCURRED TO ME: NEVER
I HAVE BEEN SO UNHAPPY THAT I HAVE BEEN CRYING: NO, NEVER
I HAVE LOOKED FORWARD WITH ENJOYMENT TO THINGS: AS MUCH AS I EVER DID

## 2024-08-27 NOTE — PROGRESS NOTES
Assessment   Problem List Items Addressed This Visit    None    She is a transfer of care from [facility]. Her dating us was done at 6w6d. Her initial blood work is complete as well as labs. All reviewed and wnl.   Plan   - New OB resources provided and reviewed with particular attention to dietary, travel, and medication restrictions  - Oriented to practice, CNM vs. MD care  - Reviewed bleeding precautions, warning signs, when to call provider; phone number provided  - Routine NOB labs ordered  - Return in 4 weeks for routine prenatal care    CORRIE Quintana-ALEXM    Subjective   Aileen Sherry Rogel is a 29 y.o.  at 9w3d with a working estimated date of delivery of 3/29/2025, by Last Menstrual Period who presents for an initial prenatal visit. This pregnancy is planned.    Patient currently experiencing: nausea, declines anti-emetics  Bleeding or cramping since LMP: no  Taking prenatal vitamin: Yes  Ultrasound completed this pregnancy: Yes - at 6 weeks 6days    Last pap:     OB History    Para Term  AB Living   2 0     1     SAB IAB Ectopic Multiple Live Births     1            # Outcome Date GA Lbr Bakari/2nd Weight Sex Type Anes PTL Lv   2 Current            1 IAB 2024     [facility] [place]        [place]  Depression Scale Total: 0  Previous history of gestational diabetes? na  Previous history of third or fourth degree laceration? na  Previous history of hypertension? na  Previous history of  section or other uterine surgery? na  Plan for genetic testing? yes with gender? yes    Prior pregnancy complications:  NA    History of hypertension:   NA    History reviewed. No pertinent past medical history.   History reviewed. No pertinent surgical history.   Social History     Socioeconomic History    Marital status: Unknown   Tobacco Use    Smoking status: Former     Types: Cigarettes    Smokeless tobacco: Never   Vaping Use    Vaping status: Never Used   Substance  and Sexual Activity    Alcohol use: Not Currently    Drug use: Never    Sexual activity: Yes     Partners: Male     Birth control/protection: None        Objective   Physical Exam  Weight: 75.3 kg (166 lb)  TWG: -0.907 kg (-2 lb)   Expected Total Weight Gain: 11.5 kg (25 lb)-16 kg (35 lb)   Pregravid BMI: 23.44  BP: 126/69    ASA in pregnancy  Moderate risk  Women with at least two moderate risk factors including:  [] Nulliparity  [] Obesity (BMI > 30)  [] Mother or sister with a history of PEC/eclampsia/HELLP  [x]  race  [] Age >= 35 years  [] Previous pregnancy with Low Birth Weight (LBW) or Small for Gestational  Age (SGA) infant  [] Previous adverse pregnancy outcome (stillbirth)  [] Pregnancy interval > 10 years  High risk  Women with at least one high risk factor including:  [] Any history of PEC/eclampsia/HELLP  [] Multifetal gestation  [] Chronic Hypertension  [] Pre-existing type 1 or 2 diabetes  [] Renal disease with proteinuria (P:C >= 0.3 mg/mg) or serum CR >= 1 mg/dL  [] Autoimmune disease (systemic lupus erythematosus, antiphospholipid  syndrome)  [] Additional indications per provider discretion        Physical Exam  Constitutional:       Appearance: Normal appearance.   HENT:      Head: Normocephalic.      Nose: Nose normal.      Mouth/Throat:      Mouth: Mucous membranes are moist.   Eyes:      Pupils: Pupils are equal, round, and reactive to light.   Cardiovascular:      Rate and Rhythm: Normal rate.      Pulses: Normal pulses.   Pulmonary:      Effort: Pulmonary effort is normal.   Abdominal:      General: Abdomen is flat.      Palpations: Abdomen is soft.   Musculoskeletal:         General: Normal range of motion.      Cervical back: Normal range of motion and neck supple.   Neurological:      General: No focal deficit present.      Mental Status: She is alert and oriented to person, place, and time.   Skin:     General: Skin is warm.   Psychiatric:         Mood and Affect: Mood  normal.         Behavior: Behavior normal. Behavior is cooperative.   Vitals reviewed.          Postpartum Depression: Low Risk  (2024)    [place]  Depression Scale     Last EPDS Total Score: 0     Last EPDS Self Harm Result: Never        Pregnancy Problems (from 24 to present)       No problems associated with this episode.             Education provided:   Avoidance of alcohol, tobacco and drug use   Dietary restrictions reviewed including avoiding raw or poorly cooked meat, lunch meat and soft cheeses  3.    Adequate water intake.  Avoid empty calories with juices  4.    Recommendation for weight gain based on initial BMI (body mass index)  5.    Limit caffeine to less than 200-300 mg/day  6.    Take folic acid 400 mcg daily.  Incorporate 5,000u Vitamin D3 per day.  Discuss Magnesium Supplementation  7.    Importance of good sleep hygiene and avoidance of laying on back after 15 weeks  8.    Encourage daily physical activity of 30 minutes a day the majority of the days of the week  9.    Discussed normal physiologic changes:  Round ligament pain, nausea, breast tenderness  10.  Discussed natural remedies, vitamins and prescription medications for nausea  11.  Baby aspirin 162mg daily (two baby aspirin) for the reduction of pre-eclampsia during pregnancy.  Even if you have          never had any blood pressure issues, you can develop hypertension during your pregnancy.  This has been well          Studied and safe to take starting at 12 weeks gestation until after the birth of your baby.    **IF AT ANYTIME DURING YOUR PREGNANCY YOU HAVE CONCERNS THAT YOU CANNOT AFFORD HEALTHY FOOD PLEASE LET US KNOW!**  We have a Food for Life program and would be happy to place a referral for you.  It is so important to eat healthy during your pregnancy and we treat food as medicine.  Healthy food is expensive!  This program will allow you and your family up to 4 to receive food and recipes for one week per  month.  This needs to be renewed every 6 months.    Ultrasound and screening for aneuploidies (Down Syndrome/Trisomy 21, Trisomy 13 + 18)  A requisition has been placed for a dating ultrasound.  Please call to get that scheduled.    At this ultrasound they will ask you if your would like to proceed with screening for genetic disorders that are listed above.  They will do that with an ultrasound at approximately 13 weeks and we also draw blood work for screening which includes the fetal sex if you desire to know.    Ultrasound for anatomy will be done at 19 weeks.  Based on risk factors and any concerns the maternal fetal medicine provider has, you may need a repeat ultrasound.  Healthy pregnancies that do not have any other concerns by the midwife or maternal fetal medicine do not have any repeat ultrasounds done.    Labs:   An order will be placed for your new ob labs.  Please get those done at the time of your ultrasound.  They will collect          multiple tubes of blood for new ob labs and also urine for STI testing and a urine culture.   If there are any concerns with any blood work or urine testing WE WILL CALL YOU OR COMMUNICATE VIA          [employer]!!!   The biggest concerns our patients have is when they see their complete blood count.  The reference          range in the result is for a non pregnant person!  We will notify you if there is any need to start an iron supplement.  3.    At 26-28 weeks a glucose test is ordered to see if you have gestational diabetes.  We also reassess if you have          Anemia, which can be common in pregnancy  4.    Group B strep culture will be done at 36 weeks gestation.    We also recommend that you be screened once in your life for Cystic Fibrosis and Spinal Muscular Atrophy.  This assesses if we need your partner to be tested if you are a carrier of a gene that can be passed along to your baby.  For this to happen, both parents must be a carrier to the gene.    Choices  "for care and hospital for birth:  I am a Certified Nurse Midwife and practice in a group setting, which means that any of the midwives in my group practice may be there for your birth.  We care for healthy females during pregnancy and labor/birth.  We practice in collaboration with physicians within our group.  If there are any concerns with your pregnancy, labor or birth our physicians work closely with us.      The midwives in our practice strongly encourage you to explore the option of Centering Pregnancy which has been studied for better birth outcomes!  Care will be done in a group setting with 1:1 time with a midwife and then in depth education about every stage of your pregnancy, labor/birth,  care, feeding choices, pediatrician options, birth control and coping techniques for the first few weeks after birth.  We have day groups and our [place] location and a Monday evening group at [facility].  The groups follow your normal prenatal schedule and yes, we keep in contact and I see you at the end of your pregnancy.    There are certain medical conditions that \"risks you out\" of midwifery care that we are constantly assessing for.  Some conditions during your pregnancy that would risk you out of midwifery care would be:   Severe growth restriction of your baby   Labor/Birth  less than 35 weeks   Severe pre-eclampsia at any time during your pregnancy/labor/birth   Gestational Diabetes needing medication (insulin) to control your blood sugars   If you decline or do not complete your glucose testing to rule out diet controlled diabetes by 32 weeks   If you are diagnosed with chronic hypertension during your pregnancy and need to start medication    The options for birth where we provide  Certified Nurse Midwifery coverage with a board certified OB/GYN, in house anesthesia and neonataology coverage are:    [facility], [facility] at [facility]" "Center  [place], [place]    To call for questions regarding your care of if you are in labor is [phone]  My nurse can answer questions and keep me updated should any questions or concerns arise during your pregnancy.    After hours, the answering service will ask you where you intended to give birth and connect you to the midwife on call at [facility] or the [facility].    If you would like to tour either facility:  Please call the Childbirth education line at [phone]    Danger signs to report:  Seek medical care immediately if you have pain that is doubling you over or vaginal bleeding that is heavier than a  period  Notify the office should you have any burning, urgency, frequency of urination or other concerning symptoms.    Medications that are safe for common discomforts of pregnancy:   Tylenol   Tums or Papaya extract for any upset stomach or heartburn   Zyrtec, Claritin, Benadryl for allergy symptoms   Sudafed or Robitussin for cold symptoms  [employer] is an maribell that is great for what medications are safe to take throughout your pregnancy and breastfeeding journey through the first year of life!  Well worth the $3.99    Work restrictions:  A normal healthy pregnancy without any complications are able to have the standard pregnancy work restrictions which is no pushing/pulling/lifting greater than 25 pounds independently    FMLA paperwork  Can be brought to the office for us to fill out for when you are starting your maternity leave (either your scheduled date of going to the hospital or your due date).  We cannot give out early FMLA when there is no documented medical conditions that are considered \"normal pregnancy\" events.    Comfort measures   Chiropractors are great for alleviation of ligament pain   Yoga is good for your ligaments and mentally preparing for baby and labor.  A prenatal yoga class is recommended.  3.     Prenatal massages are fine  4.     A maternity " support belt is an amazing thing that can help ligament pain -- can be purchased on Amazon  5.     Good supportive shoes are key to helping with ligament pain    Dental care  It is very important to see a dentist during your pregnancy for routine cleaning and also if you develop any dental pain during your pregnancy.  Healthy gums and teeth are very important during your pregnancy.  We can provide you with a dental letter if your dentist would like one.    Thank you for choosing our [facility] and [facility] for you healthcare!  I am excited to partner with you during this very special time!     If there is anything I can do to help make your experience is positive, please come to your visits with questions and concerns and do not be afraid to ask what is on your mind!  We will see you in the office every 4 weeks until you are 30 weeks, then every 2 weeks until 36 weeks and then weekly until your baby is born.    Until then, be well!  Zabrina Licona, CORRIE-ALEXM

## 2024-08-27 NOTE — PATIENT INSTRUCTIONS
TAKING GOOD CARE OF YOURSELF WHILE YOU ARE PREGNANT    What Should I Eat?  You do not have to eat a lot more food during pregnancy. But it is important to eat the right food--the most  healthy food for you and your baby. Every day, make sure you have:  6 to 8 large glasses of water.  6 to 9 servings of whole grain foods like bread or pasta. By reading the label, you will know that you are getting ‘‘whole’’ grain and not just brown-colored bread or pasta (1 slice of bread or a half cup of cooked pasta is a serving).  3 to 4 servings of fruit. Fresh, raw fruit is best (1 small apple or a half cup of chopped fruit is a serving).  4 to 5 servings of vegetables (1 medium carrot or a half cup of chopped vegetables is a serving).  2 to 3 servings of lean meat, fish, eggs, or nuts. (A piece ofmeat the size of a pack of playing cards is 1 serving.)  1 serving of vitamin C-rich food, like oranges, sweet peppers, or tomatoes (one half cup is a serving).  2 to 3 servings of iron-rich foods, like black-eyed peas, sweet potatoes, greens, dried fruit, or meat.  1 serving of a food rich in folic acid, like dark green, leafy vegetables (one half cup is a serving).    Are Some Foods Dangerous?  Most women can eat any food they want while they are pregnant. But there are some foods that can be  dangerous to the health of your baby.    Fish -- Fish is good food. And it is an important food for growing a smart baby. But some fish have lots of dangerous chemicals. To avoid these chemicals:  Do not eat swordfish, shark, carlos mackerel, or tilefish.  Eat salmon no more than 1 time per week.  Eat only ‘‘light’’ tuna. Do not eat albacore tuna.    Milk and cheese -- Dairy products are an important source of calcium, and calcium helps build strong bones and teeth. But some dairy products carry dangerous germs. To keep yourself and your baby safe, eat and drink only dairy products--such as milk, yogurt, and cheese--that are  pasteurized.    Prepared foods -- Any food that is spoiled or not cooked well can make you sick.  Do not eat any meat or fish that has not been cooked all the way through.  Do not eat any cooked food that has not been kept hot or chilled.  Wash knives, cutting boards, and your hands between handling raw meat and any other food--like fruits and vegetables--that you plan to eat raw.  Wash all fruits and vegetables with 1 tablespoon of vinegar in a pan of water to kill germs before you eat them.    Alcohol -- We know that alcohol is dangerous for your baby if you drink a lot during your pregnancy. It is safest to avoid all alcohol.    Coffee -- The most recent studies say that 2 cups of caffeinated drink each day is safe during pregnancy. This means 2 small cups of coffee or tea or 1 can of caffeinated soda.    Weight Gain  On average, the total amount of weight gain during pregnancy will fall in the following ranges:    Weight Type Average Pounds   Normal weight (BMI 18.5 - 24.9) 25 - 35 pounds   Underweight (BMI less than 18.5) 28 - 40 pounds   Overweight (BMI 25 - 29.9) 15 - 25 pounds   Obese (BMI greater or equal to 30) 11 - 20 pounds       Do I Need to Take Vitamins?  Even if your diet is good, a daily multivitamin is a good idea. All prenatal vitamins are pretty much the same,  so buy the cheapest kind. If you find that your vitamins upset your stomach, take a children’s chewable or gummy  vitamin. Be sure you get at least 400 micrograms of folic acid every day in the vitamin you chose. The number  of micrograms of folic acid is on the label of the bottle.    Is Exercise Important?  Yes! You are getting ready for an athletic event: labor! Daily exercise will help you stay fit, control your  weight, and be prepared for labor. Every day, try to get at least 30 minutes of moderate exercise like walking  or swimming. Do deep squats several times a day. This exercise will help control low back pain and help  prepare  your pelvis for delivery.    Are Some Exercises Dangerous?  You can continue to do pretty much any exercise you have been doing. It is important to avoid any danger of  blows to your stomach. You should avoid scuba diving and contact sports.    What if I Get Sick--Can I Take Medicine?  It is important to limit the medicines you take as much as possible. It is safe to take acetaminophen  (Tylenol). Avoid NSAIDs like ibuprofen (Motrin) and naproxen (Aleve).    Head cold -- Drink lots of fluids, gargle with warm salt water, take warm baths or showers, take Tylenol for headache and sore throat, suck on throat lozenges    Headaches -- Drink at least 8 big glasses of water every day, eat something healthy every 2 to 3 hours during the day, and take Tylenol    Constipation -- Drink lots of water, eat lots of fruits and vegetables, including dried fruits like prunes, and use a fiber supplement like Metamucil    Are There Danger Signs That I Need to Watch Out For?  Call your health care provider if:  You start to bleed like a period  You are leaking fluid  Your baby is not moving (after 24 weeks into your pregnancy)  You are having very bad headaches or your vision is blurry or you see ‘‘spots’’  You are having very bad pain  You are feeling very frightened or sad  You are very worried about something  NIPT testing:  NIPT Summary of Recommendations  Prenatal genetic screening (serum screening with or without nuchal translucency [NT] ultrasound or cell-free DNA screening) and diagnostic testing (chorionic villus sampling [CVS] or amniocentesis) options should be discussed and offered to all pregnant patients regardless of maternal age or risk of chromosomal abnormality. After review and discussion, every patient has the right to pursue or decline prenatal genetic screening and diagnostic testing.  If screening is accepted, patients should have one prenatal screening approach, and should not have multiple screening tests  performed simultaneously.  Cell-free DNA is the most sensitive and specific screening test for the common fetal aneuploidies. Nevertheless, it has the potential for false-positive and false-negative results. Furthermore, cell-free DNA testing is not equivalent to diagnostic testing.  All patients should be offered a second-trimester ultrasound for fetal structural defects, since these may occur with or without fetal aneuploidy; ideally this procedure is performed between 18 and 22 weeks of gestation (with or without second?trimester maternal serum alpha?fetoprotein).  Patients with a positive screening test result for fetal aneuploidy should undergo genetic counseling and a comprehensive ultrasound evaluation with an opportunity for diagnostic testing to confirm results.  Patients with a negative screening test result should be made aware that this substantially decreases their risk of the targeted aneuploidy but does not ensure that the fetus is unaffected. The potential for a fetus to be affected by genetic disorders that are not evaluated by the screening or diagnostic test should also be reviewed. Even if patients have a negative screening test result, they may choose diagnostic testing later in pregnancy, particularly if additional findings such as fetal anomalies identified on ultrasound examination become evident.  Patients whose cell-free DNA screening test results are not reported by the laboratory or are uninterpretable (a no?call test result) should be informed that test failure is associated with an increased risk of aneuploidy, receive further genetic counseling and be offered comprehensive ultrasound evaluation and diagnostic testing.  If an enlarged nuchal translucency or an anomaly is identified on ultrasound examination, the patient should be offered genetic counseling and diagnostic testing for genetic conditions and a comprehensive ultrasound evaluation including detailed ultrasonography at 18-22  weeks of gestation to assess for structural abnormalities.  The use of cell-free DNA screening as follow-up for patients with a screen positive serum analyte screening test result is an option for patients who want to avoid a diagnostic test. However, patients should be informed that this approach may delay definitive diagnosis and will fail to identify some fetuses with chromosomal abnormalities.  In clinical situations of an isolated soft ultrasonographic marker (such as echogenic cardiac focus, choroid plexus cyst, pyelectasis, short humerus or femur length) where aneuploidy screening has not been performed, the patient should be counseled regarding the risk of aneuploidy associated with the finding and cell-free DNA, quad screen testing, or amniocentesis should be offered. If aneuploidy testing is performed and is low risk, then no further risk assessment is needed. If more than one marker is identified, then genetic counseling, maternal-fetal medicine consultation, or both are recommended.  No method of aneuploidy screening that includes a serum sample is as accurate in twin gestations as it is in kasper pregnancies; this information should be incorporated into pretest counseling for patients with multiple gestations.  Cell-free DNA screening can be performed in twin pregnancies. Overall, performance of screening for trisomy 21 by cell-free DNA in twin pregnancies is encouraging, but the total number of reported affected cases is small. Given the small number of affected cases it is difficult to determine an accurate detection rate for trisomy 18 and 13.  Because preimplantation genetic testing is not uniformly accurate, prenatal screening and prenatal diagnosis should be offered to all patients regardless of previous preimplantation genetic testing.  The use of multiple serum screening approaches performed independently (eg, a first-trimester screening test followed by a quad screen as an unlinked test) is  not recommended because it will result in an unacceptably high positive screening rate and could deliver contradictory risk estimates.  In multifetal gestations, if a fetal demise, vanishing twin, or anomaly is identified in one fetus, there is a significant risk of an inaccurate test result if serum-based aneuploidy screening or cell-free DNA is used. This information should be reviewed with the patient and diagnostic testing should be offered.  Patients with unusual or multiple aneuploidies detected by cell-free DNA should be referred for genetic counseling and maternal-fetal medicine consultation.  Our contact information is below in case you or your family need to call:  Your health care provider’s name: MARE Quintana  Your health care provider’s phone number: [phone]

## 2024-08-29 LAB — BACTERIA UR CULT: NORMAL

## 2024-10-01 ENCOUNTER — APPOINTMENT (OUTPATIENT)
Dept: OBSTETRICS AND GYNECOLOGY | Facility: CLINIC | Age: 29
End: 2024-10-01
Payer: COMMERCIAL

## (undated) DEVICE — DRAPE PACK, LITHOTOMY, CUSTOM, UHC